# Patient Record
Sex: FEMALE | Race: WHITE | NOT HISPANIC OR LATINO | Employment: OTHER | URBAN - METROPOLITAN AREA
[De-identification: names, ages, dates, MRNs, and addresses within clinical notes are randomized per-mention and may not be internally consistent; named-entity substitution may affect disease eponyms.]

---

## 2018-01-25 ENCOUNTER — OFFICE VISIT (OUTPATIENT)
Dept: NEUROSURGERY | Facility: CLINIC | Age: 43
End: 2018-01-25

## 2018-01-25 VITALS
WEIGHT: 282.6 LBS | DIASTOLIC BLOOD PRESSURE: 112 MMHG | SYSTOLIC BLOOD PRESSURE: 162 MMHG | TEMPERATURE: 98.9 F | BODY MASS INDEX: 45.42 KG/M2 | HEIGHT: 66 IN

## 2018-01-25 DIAGNOSIS — M54.9 BACK PAIN WITHOUT RADIATION: Primary | ICD-10-CM

## 2018-01-25 DIAGNOSIS — M51.16 LUMBAR DISC HERNIATION WITH RADICULOPATHY: ICD-10-CM

## 2018-01-25 PROCEDURE — 99213 OFFICE O/P EST LOW 20 MIN: CPT | Performed by: PHYSICIAN ASSISTANT

## 2018-01-25 RX ORDER — CARISOPRODOL 350 MG/1
TABLET ORAL
Refills: 0 | COMMUNITY
Start: 2018-01-14

## 2018-01-25 RX ORDER — LISINOPRIL 10 MG/1
TABLET ORAL
Refills: 5 | COMMUNITY
Start: 2017-12-07

## 2018-01-25 RX ORDER — OMEGA-3-ACID ETHYL ESTERS 1 G/1
CAPSULE, LIQUID FILLED ORAL
Refills: 5 | COMMUNITY
Start: 2018-01-07

## 2018-01-25 RX ORDER — GABAPENTIN 300 MG/1
CAPSULE ORAL
Refills: 0 | COMMUNITY
Start: 2017-11-14 | End: 2018-03-16 | Stop reason: SDUPTHER

## 2018-01-25 NOTE — PROGRESS NOTES
"  Vashti Arredondo is a 42 y.o. female who presents with complaints of acute on chronic low back pain.    HISTORY OF PRESENT ILLNESS and HOSPITAL COURSE:  Vashti was last seen in our practice by Dr. Cruz on 12/01/16 with complaints of low back pain radiating to her left hip and leg.  On 3/13/15 she had a left L2 3 lumbar discectomy and did very well for a month or 2 but pain gradually returned.  An MRI scan of the lumbar spine was performed and demonstrated degenerative disc change at L2-3 and L3 4, no evidence of recurrent disc herniation at either level or at any other level.    She presents today with worsening pain that she describes unbearable and in the low back and radiates to the left buttock and left leg to the knee.  She has had low back pain since 2012, however, she states that it has significantly worsened over the past several months.    She was seen in the emergency department at [facility] on 01/13/18 for intractable back pain, at which time x-rays, lumbar spine, were performed.  Dr. Cruz and I reviewed the images today, however, further imaging with MRI is necessary to evaluate the etiology of her symptoms and possible evidence of nerve root compression.  She states that the pain is accompanied with tingling sensation and she has intermittent episodes of a \"shocking sensation\" with certain movements.  She gains some mild relief with leaning over as she ambulates, however walking in general worsens her symptoms.    REVIEW OF SYSTEMS:  Review of Systems   Constitutional: Positive for activity change and fatigue.   Musculoskeletal: Positive for arthralgias and back pain.   Neurological: Positive for weakness and numbness.   Psychiatric/Behavioral: Negative for sleep disturbance.       The following portions of the patient's history were reviewed and updated as appropriate: allergies, current medications, past family history, past medical history, past social history, past surgical history " and problem list.     PHYSICAL EXAM:  Physical Exam   Constitutional: She is oriented to person, place, and time. She appears well-developed and well-nourished. She appears distressed.   Neck: Normal range of motion. Neck supple.   Pulmonary/Chest: Effort normal and breath sounds normal.   Musculoskeletal:        Lumbar back: She exhibits decreased range of motion and pain.   Neurological: She is alert and oriented to person, place, and time. No cranial nerve deficit or sensory deficit. Gait abnormal.   Reflex Scores:       Patellar reflexes are 2+ on the right side and 2+ on the left side.       Achilles reflexes are 2+ on the right side and 2+ on the left side.  SLR + 30 degrees, left  SLR - , right   Skin: Skin is warm and dry.   Psychiatric: Her behavior is normal. Judgment and thought content normal. Her mood appears anxious. She exhibits a depressed mood.        ASSESSMENT AND PLAN:  Ms. Arredondo presents today with acute on chronic low back pain that radiates to the left buttock and leg.  MRI lumbar spine, with and without contrast, was ordered.  She will return in approximately 1 week after MRI is performed and Dr. Cruz and I will review the images at that time and provide further neurosurgical assessment and plan pertaining thereto.  She was instructed to contact her PCP for pain management prior to her follow up appointment as Dr. Cruz does not provide narcotic pain medication unless a patient is in the postoperative 90 days.

## 2018-02-01 ENCOUNTER — OFFICE VISIT (OUTPATIENT)
Dept: NEUROSURGERY | Facility: CLINIC | Age: 43
End: 2018-02-01

## 2018-02-01 ENCOUNTER — HOSPITAL ENCOUNTER (OUTPATIENT)
Dept: MRI IMAGING | Facility: HOSPITAL | Age: 43
Discharge: HOME | End: 2018-02-01
Admitting: PHYSICIAN ASSISTANT

## 2018-02-01 VITALS
BODY MASS INDEX: 45.67 KG/M2 | HEIGHT: 66 IN | WEIGHT: 284.2 LBS | SYSTOLIC BLOOD PRESSURE: 140 MMHG | DIASTOLIC BLOOD PRESSURE: 90 MMHG | TEMPERATURE: 98 F

## 2018-02-01 DIAGNOSIS — M51.36 DDD (DEGENERATIVE DISC DISEASE), LUMBAR: Primary | ICD-10-CM

## 2018-02-01 DIAGNOSIS — M51.16 LUMBAR DISC HERNIATION WITH RADICULOPATHY: ICD-10-CM

## 2018-02-01 DIAGNOSIS — M51.16 LUMBAR DISC DISEASE WITH RADICULOPATHY: ICD-10-CM

## 2018-02-01 DIAGNOSIS — M54.9 BACK PAIN WITHOUT RADIATION: ICD-10-CM

## 2018-02-01 PROCEDURE — 0 GADOBENATE DIMEGLUMINE 529 MG/ML SOLUTION: Performed by: PHYSICIAN ASSISTANT

## 2018-02-01 PROCEDURE — A9577 INJ MULTIHANCE: HCPCS | Performed by: PHYSICIAN ASSISTANT

## 2018-02-01 PROCEDURE — 72158 MRI LUMBAR SPINE W/O & W/DYE: CPT

## 2018-02-01 PROCEDURE — 82565 ASSAY OF CREATININE: CPT

## 2018-02-01 PROCEDURE — 99213 OFFICE O/P EST LOW 20 MIN: CPT | Performed by: PHYSICIAN ASSISTANT

## 2018-02-01 RX ADMIN — GADOBENATE DIMEGLUMINE 20 ML: 529 INJECTION, SOLUTION INTRAVENOUS at 09:30

## 2018-02-01 NOTE — PROGRESS NOTES
"  Vashti rAredondo is a 42 y.o. female who presents with complaints of low back pian, left buttock and leg pain.    HISTORY OF PRESENT ILLNESS and HOSPITAL COURSE:  Vashti was evaluated in our practice by Dr. Cruz on 12/01/16 with complaints of low back pain radiating to her left hip and leg.  On 3/13/15 she had a left L2 3 lumbar discectomy and did very well for a month or 2 but pain gradually returned.  An MRI scan of the lumbar spine was performed and demonstrated degenerative disc change at L2-3 and L3 4, no evidence of recurrent disc herniation at either level or at any other level.    She was seen in the emergency department at [facility] on 01/13/18 for intractable back pain, at which time x-rays, lumbar spine, were performed.  Dr. Cruz and I reviewed the images , however, further imaging with MRI was necessary to evaluate the etiology of her symptoms and possible evidence of nerve root compression.  She states that the pain is accompanied with tingling sensation and she has intermittent episodes of a \"shocking sensation\" with certain movements.  She gains some mild relief with leaning over as she ambulates, however walking in general worsens her symptoms.    She presents today for further evaluation with MRI lumbar spine, with and without contrast, images.  Dr. Cruz and I reviewed the MRI images which demonstrate a migrated disc fragment, L4-5, left.  This correlates with her subjective and objective exam findings.  She has not participated in physical therapy recently, therefore, she was provided with an order to begin PT, twice weekly, for 2-4 weeks, for possible symptom relief.  She was instructed to contact our office if her symptoms worsen with physical therapy.      REVIEW OF SYSTEMS:  Review of Systems  Constitutional: Positive for activity change and fatigue.   Musculoskeletal: Positive for arthralgias and back pain.   Neurological: Positive for weakness and numbness. "   Psychiatric/Behavioral: Negative for sleep disturbance.     The following portions of the patient's history were reviewed and updated as appropriate: allergies, current medications, past family history, past medical history, past social history, past surgical history and problem list.     PHYSICAL EXAM:  Physical Exam   Constitutional: She is oriented to person, place, and time. She appears well-developed and well-nourished. She appears distressed.   Neck: Normal range of motion. Neck supple.   Pulmonary/Chest: Effort normal and breath sounds normal.   Musculoskeletal:        Lumbar back: She exhibits decreased range of motion and pain.   Neurological: She is alert and oriented to person, place, and time. No cranial nerve deficit or sensory deficit. Gait abnormal.   Reflex Scores:       Patellar reflexes are 2+ on the right side and 2+ on the left side.       Achilles reflexes are 2+ on the right side and 2+ on the left side.  SLR + 30 degrees, left  SLR - , right   Skin: Skin is warm and dry.   Psychiatric: Her behavior is normal. Judgment and thought content normal. Her mood appears anxious. She exhibits a depressed mood.     ASSESSMENT AND PLAN:  Ms. Arredondo is a delightful patient that presents today for MRI lumbar spine image review due to complaints of intractable back pain that radiates to the left buttock and leg and stops at the left knee.  MRI images demonstrate a migrated disc fragment, L4-5, left, that correlates with her exam findings.  She will participate in physical therapy in the next 2 weeks and will return for further neurosurgical evaluation with Dr. Cruz.  She was instructed to stop physical therapy if her symptoms worsen by participating in such.

## 2018-02-02 LAB — CREAT BLDA-MCNC: 0.5 MG/DL (ref 0.6–1.3)

## 2018-02-15 ENCOUNTER — OFFICE VISIT (OUTPATIENT)
Dept: NEUROSURGERY | Facility: CLINIC | Age: 43
End: 2018-02-15

## 2018-02-15 VITALS — WEIGHT: 284.39 LBS | HEIGHT: 66 IN | BODY MASS INDEX: 45.71 KG/M2

## 2018-02-15 DIAGNOSIS — M51.36 BULGING LUMBAR DISC: Primary | ICD-10-CM

## 2018-02-15 PROBLEM — M51.369 BULGING LUMBAR DISC: Status: ACTIVE | Noted: 2018-02-15

## 2018-02-15 PROCEDURE — 99213 OFFICE O/P EST LOW 20 MIN: CPT | Performed by: NEUROLOGICAL SURGERY

## 2018-02-15 RX ORDER — AMLODIPINE BESYLATE 5 MG/1
TABLET ORAL
Refills: 5 | COMMUNITY
Start: 2018-02-03

## 2018-02-15 NOTE — PROGRESS NOTES
Subjective   Vashti Arredondo is a 42 y.o. female who presents for follow up of  left hip and leg pain.     The patient is a 42-year-old woman with a complaint of pain in her left hip and leg since early December, now 2-1/2 months.  Began spontaneously and has been severe ever since radiating as far as her left knee.  She has a past history of left L2-3 lumbar discectomy in March 2015.  She has been through extended physical therapy in the past 2 months but no improvement seen.    Today lumbar MRI scan shows a possible recurrent disc herniation at L2-3 on the left, and a questionable inferiorly migrated fragment small in size medial to the pedicle at L4-5 on the left.    The following portions of the patient's history were reviewed, updated as appropriate and approved: allergies, current medications, past family history, past medical history, past social history, past surgical history, review of systems and problem list.     Review of Systems   Constitutional: Negative for activity change, appetite change, chills, diaphoresis, fatigue, fever and unexpected weight change.   HENT: Negative for congestion, dental problem, drooling, ear discharge, ear pain, facial swelling, hearing loss, mouth sores, nosebleeds, postnasal drip, rhinorrhea, sinus pressure, sneezing, sore throat, tinnitus, trouble swallowing and voice change.    Eyes: Negative for photophobia, pain, discharge, redness, itching and visual disturbance.   Respiratory: Negative for apnea, cough, choking, chest tightness, shortness of breath, wheezing and stridor.    Cardiovascular: Negative for chest pain, palpitations and leg swelling.   Gastrointestinal: Negative for abdominal distention, abdominal pain, anal bleeding, blood in stool, constipation, diarrhea, nausea, rectal pain and vomiting.   Endocrine: Negative for cold intolerance, heat intolerance, polydipsia, polyphagia and polyuria.   Genitourinary: Negative for decreased urine volume, difficulty  urinating, dysuria, enuresis, flank pain, frequency, genital sores, hematuria and urgency.   Musculoskeletal: Positive for arthralgias and back pain. Negative for gait problem, joint swelling, myalgias, neck pain and neck stiffness.   Skin: Negative for color change, pallor, rash and wound.   Allergic/Immunologic: Negative for environmental allergies, food allergies and immunocompromised state.   Neurological: Positive for weakness and numbness. Negative for dizziness, tremors, seizures, syncope, facial asymmetry, speech difficulty, light-headedness and headaches.   Hematological: Negative for adenopathy. Does not bruise/bleed easily.   Psychiatric/Behavioral: Positive for sleep disturbance. Negative for agitation, behavioral problems, confusion, decreased concentration, dysphoric mood, hallucinations, self-injury and suicidal ideas. The patient is not nervous/anxious and is not hyperactive.        Objective    NEUROLOGICAL EXAMINATION:    SLR positive on the left at 35°.  Motor and sensory are intact.  Left knee jerk absent right knee jerk 2+, ankle jerks 2+.    Assessment   Recurrent disc herniation left L2-3 versus left L4-5.  Distribution of the pain no lower than the knee and reduced or absent left knee reflex suggests the L2-3 level has responsible.       Plan   Lumbar myelogram       John Cruz MD

## 2018-02-19 ENCOUNTER — APPOINTMENT (OUTPATIENT)
Dept: CT IMAGING | Facility: HOSPITAL | Age: 43
End: 2018-02-19

## 2018-02-19 ENCOUNTER — HOSPITAL ENCOUNTER (OUTPATIENT)
Facility: HOSPITAL | Age: 43
Setting detail: HOSPITAL OUTPATIENT SURGERY
Discharge: HOME | End: 2018-02-19
Attending: RADIOLOGY | Admitting: RADIOLOGY

## 2018-02-19 VITALS
TEMPERATURE: 97.7 F | OXYGEN SATURATION: 98 % | SYSTOLIC BLOOD PRESSURE: 120 MMHG | DIASTOLIC BLOOD PRESSURE: 86 MMHG | BODY MASS INDEX: 45.07 KG/M2 | RESPIRATION RATE: 16 BRPM | HEIGHT: 66 IN | WEIGHT: 280.43 LBS | HEART RATE: 107 BPM

## 2018-02-19 DIAGNOSIS — M51.36 BULGING LUMBAR DISC: ICD-10-CM

## 2018-02-19 LAB — GLUCOSE BLDC GLUCOMTR-MCNC: 306 MG/DL (ref 70–130)

## 2018-02-19 PROCEDURE — 62304 MYELOGRAPHY LUMBAR INJECTION: CPT | Performed by: RADIOLOGY

## 2018-02-19 PROCEDURE — 0 IOPAMIDOL 41 % SOLUTION: Performed by: RADIOLOGY

## 2018-02-19 PROCEDURE — 82962 GLUCOSE BLOOD TEST: CPT

## 2018-02-19 PROCEDURE — 72132 CT LUMBAR SPINE W/DYE: CPT

## 2018-02-19 RX ORDER — LIDOCAINE HYDROCHLORIDE 10 MG/ML
INJECTION, SOLUTION EPIDURAL; INFILTRATION; INTRACAUDAL; PERINEURAL AS NEEDED
Status: DISCONTINUED | OUTPATIENT
Start: 2018-02-19 | End: 2018-02-19 | Stop reason: HOSPADM

## 2018-03-01 ENCOUNTER — OFFICE VISIT (OUTPATIENT)
Dept: NEUROSURGERY | Facility: CLINIC | Age: 43
End: 2018-03-01

## 2018-03-01 VITALS — WEIGHT: 282 LBS | TEMPERATURE: 98 F | HEIGHT: 66 IN | BODY MASS INDEX: 45.32 KG/M2

## 2018-03-01 DIAGNOSIS — M51.16 LUMBAR DISC DISEASE WITH RADICULOPATHY: Primary | ICD-10-CM

## 2018-03-01 DIAGNOSIS — M51.16 LUMBAR DISC HERNIATION WITH RADICULOPATHY: ICD-10-CM

## 2018-03-01 PROCEDURE — 99213 OFFICE O/P EST LOW 20 MIN: CPT | Performed by: NEUROLOGICAL SURGERY

## 2018-03-01 NOTE — PROGRESS NOTES
Subjective   Vashti Arredondo is a 42 y.o. female who presents for follow up of  recurrent left hip and leg pain.     The patient developed severe left hip and leg pain in early December.  MRI scan showed a possible recurrent disc herniation at L2-3 on the left, where she had a discectomy in March 2015, 3 years ago.  She was treated with physical therapy for 2 months.  Symptoms have improved although still bother her quite noticeably from time to time.    Lumbar myelogram was performed and shows a disc bulge at L2-3 on the left without any sherron free fragment.    The following portions of the patient's history were reviewed, updated as appropriate and approved: allergies, current medications, past family history, past medical history, past social history, past surgical history, review of systems and problem list.     Review of Systems   Constitutional: Negative for activity change, appetite change, chills, diaphoresis, fatigue, fever and unexpected weight change.   HENT: Negative for congestion, dental problem, drooling, ear discharge, ear pain, facial swelling, hearing loss, mouth sores, nosebleeds, postnasal drip, rhinorrhea, sinus pressure, sneezing, sore throat, tinnitus, trouble swallowing and voice change.    Eyes: Negative for photophobia, pain, discharge, redness, itching and visual disturbance.   Respiratory: Negative for apnea, cough, choking, chest tightness, shortness of breath, wheezing and stridor.    Cardiovascular: Negative for chest pain, palpitations and leg swelling.   Gastrointestinal: Negative for abdominal distention, abdominal pain, anal bleeding, blood in stool, constipation, diarrhea, nausea, rectal pain and vomiting.   Endocrine: Negative for cold intolerance, heat intolerance, polydipsia, polyphagia and polyuria.   Genitourinary: Negative for decreased urine volume, difficulty urinating, dysuria, enuresis, flank pain, frequency, genital sores, hematuria and urgency.   Musculoskeletal:  Positive for back pain and gait problem. Negative for arthralgias, joint swelling, myalgias, neck pain and neck stiffness.   Skin: Negative for color change, pallor, rash and wound.   Allergic/Immunologic: Negative for environmental allergies, food allergies and immunocompromised state.   Neurological: Negative for dizziness, tremors, seizures, syncope, facial asymmetry, speech difficulty, weakness, light-headedness, numbness and headaches.   Hematological: Negative for adenopathy. Does not bruise/bleed easily.   Psychiatric/Behavioral: Negative for agitation, behavioral problems, confusion, decreased concentration, dysphoric mood, hallucinations, self-injury, sleep disturbance and suicidal ideas. The patient is not nervous/anxious and is not hyperactive.    All other systems reviewed and are negative.      Objective    NEUROLOGICAL EXAMINATION:    SLR positive at 40° on the left.  Motor and sensory intact.  Left knee jerk absent, right knee jerk 2+.    Assessment   Small recurrent disc herniation L2-3 left, some improvement in symptoms over the past 3 months.       Plan   I believe conservative management is best this time.  With the findings on the studies I believe it would be necessary to do a complete facetectomy at L2-3 on the left to get sufficient access to this disc without injuring the dura or nerve root, and this would introduce and instability that would lead to need for stabilization with interbody fusion and pedicle screws.  For the time being if symptoms are manageable a nonsurgical approach were discussed.       John Cruz MD

## 2018-03-16 ENCOUNTER — TELEPHONE (OUTPATIENT)
Dept: NEUROSURGERY | Facility: CLINIC | Age: 43
End: 2018-03-16

## 2018-03-16 DIAGNOSIS — IMO0002 UNCONTROLLED TYPE 2 DIABETES MELLITUS WITH COMPLICATION, WITHOUT LONG-TERM CURRENT USE OF INSULIN: Primary | ICD-10-CM

## 2018-03-16 PROBLEM — E11.69 DM COMA, TYPE 2, NOT AT GOAL (HCC): Status: ACTIVE | Noted: 2018-03-16

## 2018-03-16 RX ORDER — GABAPENTIN 300 MG/1
300 CAPSULE ORAL 4 TIMES DAILY
Qty: 120 CAPSULE | Refills: 0 | OUTPATIENT
Start: 2018-03-16

## 2018-03-16 NOTE — TELEPHONE ENCOUNTER
Medication was called in for patient.  I have checked the entire Epic referral system and they do not have one for PCP.  I did look up PCP's at [facility] and found a Dr. Atkinson at [facility]. I tried to reach patient on both numbers but was unable to reach her or to leave a message.  I will try to reach her later.  Patient needs to be put on the schedule with RIK Zaragoza in April.    Pharmacy called back to confirm quantity at 3:24.  I reiterated that this is an increase.

## 2018-03-16 NOTE — TELEPHONE ENCOUNTER
Provider:  Anthony  Caller: Vashti Arredondo  Time of call:   08:56 AM  Phone #:  [phone]  Last visit:   03/01/18  Next visit: none scheduled    Reason for call:       Dr Cruz told her he wanted to wait to do the surgery as long as possible because it was going to be such a big surgery. She doesn't feel like she can wait any longer because she's in so much pain. Wants to know if we can proceed with surgery or what else Dr. Cruz wants to do

## 2018-03-16 NOTE — TELEPHONE ENCOUNTER
Patient states that she is not able to take Steroids because she has diabetes.  She is not allowed to even have injections.  She is not insulin dependent, however her glucose runs in the 300's.

## 2018-03-16 NOTE — TELEPHONE ENCOUNTER
I made the referral to Internal medicine at [facility]. This would be my preference if they take her insurance.

## 2018-03-16 NOTE — TELEPHONE ENCOUNTER
"Spoke with patient.   She states that her blood sugar runs 300 or so. She is taking metformin only.   She is \"in between\" PCPs, and is not seeing anyone about his right now.  She is having trouble finding someone who takes her insurance.       Her pain is in her back and left leg. The electric shock feeling is the worst part of the pain. It feels throbbing and aching as well.        She is taking neurontin TID. She can't take tramadol b/c it gives her migraine headaches.  Soma did not help with her pain.     Hold the steroids for now since she's not managing her blood sugars; we will increase her gabapentin to 4x daily. (300mg)     Please have her come in to see me in follow up in Early april      "

## 2018-04-02 ENCOUNTER — TELEPHONE (OUTPATIENT)
Dept: NEUROSURGERY | Facility: CLINIC | Age: 43
End: 2018-04-02

## 2018-04-02 NOTE — TELEPHONE ENCOUNTER
"Provider:  Bean  Caller: pt  Time of call:   09:37am  Phone #:  [phone]  Surgery:    Surgery Date:    Last visit:   03/01/18  Next visit: NA    Reason for call:         Pt left msg stating, \"I know my sugar is bad, but I'm willing to take the risk. I can't take the pain anymore.\"    Spoke with pt- she wants to move forward with surgery.  States the Neurontin 300mg QID does not help.     I checked on PCP referral- [facility]  Pt is speaking with referral coordinator now for details and appt info.      Pt would like to know what else we can do to help with her pain or move forward with surgery.  Please advise.     "

## 2018-04-02 NOTE — TELEPHONE ENCOUNTER
She has the option of seeing pain management for an injection.  From previous messages, Mary asked to have the patient placed on her schedule in early April. I do not see that that is scheduled.

## 2018-04-09 ENCOUNTER — OFFICE VISIT (OUTPATIENT)
Dept: NEUROSURGERY | Facility: CLINIC | Age: 43
End: 2018-04-09

## 2018-04-09 VITALS
SYSTOLIC BLOOD PRESSURE: 150 MMHG | WEIGHT: 286.4 LBS | BODY MASS INDEX: 46.03 KG/M2 | TEMPERATURE: 98.5 F | HEIGHT: 66 IN | DIASTOLIC BLOOD PRESSURE: 90 MMHG

## 2018-04-09 DIAGNOSIS — M54.42 CHRONIC MIDLINE LOW BACK PAIN WITH LEFT-SIDED SCIATICA: Primary | ICD-10-CM

## 2018-04-09 DIAGNOSIS — G89.29 CHRONIC MIDLINE LOW BACK PAIN WITH LEFT-SIDED SCIATICA: Primary | ICD-10-CM

## 2018-04-09 PROCEDURE — 99213 OFFICE O/P EST LOW 20 MIN: CPT | Performed by: PHYSICIAN ASSISTANT

## 2018-04-09 NOTE — PROGRESS NOTES
"Subjective     Chief Complaint: : Vashti Arredondo is a 42 y.o. female here today for follow up for recurrent left hip and leg pain    History of Present Illness  Vashti Arredondo is a 42 y.o. female who had a discectomy three years ago (3/2015) at L2/3 on the left. She had done well until early this last December, when she presented with recurrent left hip and leg pain in the same distribution. After two months of physical therapy, a lumbar myelogram was performed which shows a disc bulge at L2/3 on the left.  She saw Dr Cruz about one month ago and conservative treatment seemed to be helping. He believes that surgical treatment of this will require a full facetectomy and probable fusion and has therefore encouraged continued conservative treatment. She is a poor candidate for fusion due to obesity and poorly controlled diabetes.   However, he pain has continued to be very severe. She is unable to walk or be up for more than a few minutes at a time. She describes pain across her low back and an \"electric shock\" feeling in the posterior and lateral part of her left thigh which is present all the time. The pain sometimes causes nausea due to its severity. She is unable to take steroids due to her diabetes. She is under the care of the diabetic center on [facility] ([facility] She is taking gabapentin qid, which helps with her diabetic neuropathy, but not really with this pain. She alternates tylenol and ibuprofen for some relief. She had soma, but this is not really a muscle spasm and that medication did not help.  She denies saddle anesthesia. She does have episodes of urge incontinence because she moves slowly and has difficulty getting to the bathroom on time.     The following portions of the patient's history were reviewed and updated as appropriate: allergies, current medications, past family history, past medical history, past social history, past surgical history and problem " list.    Family history:   Family History   Problem Relation Age of Onset   • COPD Mother    • Asthma Mother    • Heart disease Father        Social history:   Social History     Social History   • Marital status:      Spouse name: N/A   • Number of children: N/A   • Years of education: N/A     Occupational History   • Not on file.     Social History Main Topics   • Smoking status: Current Every Day Smoker     Packs/day: 1.00     Types: Cigarettes   • Smokeless tobacco: Never Used   • Alcohol use No   • Drug use: No   • Sexual activity: Defer     Other Topics Concern   • Not on file     Social History Narrative   • No narrative on file       Review of Systems   Constitutional: Negative for activity change, appetite change, chills, diaphoresis, fatigue, fever and unexpected weight change.   HENT: Negative for congestion, dental problem, drooling, ear discharge, ear pain, facial swelling, hearing loss, mouth sores, nosebleeds, postnasal drip, rhinorrhea, sinus pressure, sneezing, sore throat, tinnitus, trouble swallowing and voice change.    Eyes: Negative for photophobia, pain, discharge, redness, itching and visual disturbance.   Respiratory: Negative for apnea, cough, choking, chest tightness, shortness of breath, wheezing and stridor.    Cardiovascular: Negative for chest pain, palpitations and leg swelling.   Gastrointestinal: Negative for abdominal distention, abdominal pain, anal bleeding, blood in stool, constipation, diarrhea, nausea, rectal pain and vomiting.   Endocrine: Negative for cold intolerance, heat intolerance, polydipsia, polyphagia and polyuria.   Genitourinary: Negative for decreased urine volume, difficulty urinating, dysuria, enuresis, flank pain, frequency, genital sores, hematuria and urgency.   Musculoskeletal: Positive for back pain and gait problem. Negative for arthralgias, joint swelling, myalgias, neck pain and neck stiffness.   Skin: Negative for color change, pallor, rash and  "wound.   Allergic/Immunologic: Negative for environmental allergies, food allergies and immunocompromised state.   Neurological: Negative for dizziness, tremors, seizures, syncope, facial asymmetry, speech difficulty, weakness, light-headedness, numbness and headaches.   Hematological: Negative for adenopathy. Does not bruise/bleed easily.   Psychiatric/Behavioral: Negative for agitation, behavioral problems, confusion, decreased concentration, dysphoric mood, hallucinations, self-injury, sleep disturbance and suicidal ideas. The patient is not nervous/anxious and is not hyperactive.    All other systems reviewed and are negative.      Objective   Blood pressure 150/90, temperature 98.5 °F (36.9 °C), height 167.6 cm (65.98\"), weight 130 kg (286 lb 6.4 oz).  Body mass index is 46.25 kg/m².    Physical Exam   Constitutional: She is oriented to person, place, and time. She appears well-developed and well-nourished.   In obvious pain but not distressed   HENT:   Head: Normocephalic and atraumatic.   Eyes: EOM are normal. Pupils are equal, round, and reactive to light.   Neck: Normal range of motion. Neck supple.   Pulmonary/Chest: Effort normal. No respiratory distress.   Musculoskeletal: She exhibits no tenderness.   Gait is very slow and antalgic. She endorses shooting pains when standing from a sitting position, stepping up to exam table, and turning while walking. No sherron weakness   Neurological: She is alert and oriented to person, place, and time. No cranial nerve deficit. She displays no Babinski's sign on the right side. She displays no Babinski's sign on the left side.   Reflex Scores:       Patellar reflexes are 1+ on the right side and 0 on the left side.       Achilles reflexes are 1+ on the right side and 0 on the left side.  sensation intact.     Assessment/Plan   Dr. Cruz has reviewed her MRI and myelogram. He does not feel that she would be benefited by surgery, and is concerned that her risk level " would be unacceptably high as well.  He has recommended referral to pain management; a referral has been placed.     There are no diagnoses linked to this encounter.    No Follow-up on file.

## 2018-04-10 ENCOUNTER — TELEPHONE (OUTPATIENT)
Dept: NEUROSURGERY | Facility: CLINIC | Age: 43
End: 2018-04-10

## 2018-04-10 NOTE — TELEPHONE ENCOUNTER
Spoke with patient about pain management; they wouldn't do RIMA because diabetes. We will try to get her in with someone asap.

## 2018-04-13 NOTE — TELEPHONE ENCOUNTER
Provider: Anthony    Last visit:  04/09/18 (Mary)   Next visit: PRN    Reason for call:       See ET's last note.

## 2018-04-13 NOTE — TELEPHONE ENCOUNTER
Is Dr. Nuñez willing to meet with her about his recommendations? Yes, she should talk with her PCP about her medical/weight issues, but I think she's gone that route too.

## 2018-04-24 ENCOUNTER — TELEPHONE (OUTPATIENT)
Dept: PAIN MEDICINE | Facility: CLINIC | Age: 43
End: 2018-04-24

## 2018-06-20 ENCOUNTER — APPOINTMENT (OUTPATIENT)
Dept: CT IMAGING | Facility: HOSPITAL | Age: 43
End: 2018-06-20

## 2018-06-20 ENCOUNTER — HOSPITAL ENCOUNTER (EMERGENCY)
Facility: HOSPITAL | Age: 43
Discharge: HOME | End: 2018-06-20
Attending: EMERGENCY MEDICINE | Admitting: EMERGENCY MEDICINE

## 2018-06-20 VITALS
HEIGHT: 66 IN | SYSTOLIC BLOOD PRESSURE: 152 MMHG | DIASTOLIC BLOOD PRESSURE: 91 MMHG | WEIGHT: 280 LBS | TEMPERATURE: 98.7 F | BODY MASS INDEX: 45 KG/M2 | HEART RATE: 106 BPM | OXYGEN SATURATION: 97 % | RESPIRATION RATE: 20 BRPM

## 2018-06-20 DIAGNOSIS — R10.31 RIGHT LOWER QUADRANT ABDOMINAL PAIN: Primary | ICD-10-CM

## 2018-06-20 LAB
ALBUMIN SERPL-MCNC: 4.34 G/DL (ref 3.2–4.8)
ALBUMIN/GLOB SERPL: 1.4 G/DL (ref 1.5–2.5)
ALP SERPL-CCNC: 122 U/L (ref 25–100)
ALT SERPL W P-5'-P-CCNC: 81 U/L (ref 7–40)
ANION GAP SERPL CALCULATED.3IONS-SCNC: 10 MMOL/L (ref 3–11)
AST SERPL-CCNC: 78 U/L (ref 0–33)
B-HCG UR QL: NEGATIVE
BASOPHILS # BLD AUTO: 0.04 10*3/MM3 (ref 0–0.2)
BASOPHILS NFR BLD AUTO: 0.3 % (ref 0–1)
BILIRUB SERPL-MCNC: 0.5 MG/DL (ref 0.3–1.2)
BILIRUB UR QL STRIP: NEGATIVE
BUN BLD-MCNC: 9 MG/DL (ref 9–23)
BUN/CREAT SERPL: 14.5 (ref 7–25)
CALCIUM SPEC-SCNC: 9.4 MG/DL (ref 8.7–10.4)
CHLORIDE SERPL-SCNC: 109 MMOL/L (ref 99–109)
CLARITY UR: CLEAR
CO2 SERPL-SCNC: 21 MMOL/L (ref 20–31)
COLOR UR: YELLOW
CREAT BLD-MCNC: 0.62 MG/DL (ref 0.6–1.3)
DEPRECATED RDW RBC AUTO: 46.3 FL (ref 37–54)
EOSINOPHIL # BLD AUTO: 0.47 10*3/MM3 (ref 0–0.3)
EOSINOPHIL NFR BLD AUTO: 3.4 % (ref 0–3)
ERYTHROCYTE [DISTWIDTH] IN BLOOD BY AUTOMATED COUNT: 14.2 % (ref 11.3–14.5)
GFR SERPL CREATININE-BSD FRML MDRD: 105 ML/MIN/1.73
GLOBULIN UR ELPH-MCNC: 3.1 GM/DL
GLUCOSE BLD-MCNC: 132 MG/DL (ref 70–100)
GLUCOSE UR STRIP-MCNC: NEGATIVE MG/DL
HCT VFR BLD AUTO: 42.6 % (ref 34.5–44)
HGB BLD-MCNC: 14.1 G/DL (ref 11.5–15.5)
HGB UR QL STRIP.AUTO: NEGATIVE
IMM GRANULOCYTES # BLD: 0.05 10*3/MM3 (ref 0–0.03)
IMM GRANULOCYTES NFR BLD: 0.4 % (ref 0–0.6)
INTERNAL NEGATIVE CONTROL: NEGATIVE
INTERNAL POSITIVE CONTROL: POSITIVE
KETONES UR QL STRIP: NEGATIVE
LEUKOCYTE ESTERASE UR QL STRIP.AUTO: NEGATIVE
LIPASE SERPL-CCNC: 57 U/L (ref 6–51)
LYMPHOCYTES # BLD AUTO: 5.25 10*3/MM3 (ref 0.6–4.8)
LYMPHOCYTES NFR BLD AUTO: 38.3 % (ref 24–44)
Lab: NORMAL
MCH RBC QN AUTO: 29.6 PG (ref 27–31)
MCHC RBC AUTO-ENTMCNC: 33.1 G/DL (ref 32–36)
MCV RBC AUTO: 89.3 FL (ref 80–99)
MONOCYTES # BLD AUTO: 0.95 10*3/MM3 (ref 0–1)
MONOCYTES NFR BLD AUTO: 6.9 % (ref 0–12)
NEUTROPHILS # BLD AUTO: 7 10*3/MM3 (ref 1.5–8.3)
NEUTROPHILS NFR BLD AUTO: 51.1 % (ref 41–71)
NITRITE UR QL STRIP: NEGATIVE
PH UR STRIP.AUTO: 5.5 [PH] (ref 5–8)
PLATELET # BLD AUTO: 234 10*3/MM3 (ref 150–450)
PMV BLD AUTO: 11.4 FL (ref 6–12)
POTASSIUM BLD-SCNC: 3.8 MMOL/L (ref 3.5–5.5)
PROT SERPL-MCNC: 7.4 G/DL (ref 5.7–8.2)
PROT UR QL STRIP: NEGATIVE
RBC # BLD AUTO: 4.77 10*6/MM3 (ref 3.89–5.14)
SODIUM BLD-SCNC: 140 MMOL/L (ref 132–146)
SP GR UR STRIP: 1.01 (ref 1–1.03)
UROBILINOGEN UR QL STRIP: NORMAL
WBC NRBC COR # BLD: 13.71 10*3/MM3 (ref 3.5–10.8)

## 2018-06-20 PROCEDURE — 74177 CT ABD & PELVIS W/CONTRAST: CPT

## 2018-06-20 PROCEDURE — 85025 COMPLETE CBC W/AUTO DIFF WBC: CPT | Performed by: NURSE PRACTITIONER

## 2018-06-20 PROCEDURE — 25010000002 KETOROLAC TROMETHAMINE PER 15 MG: Performed by: NURSE PRACTITIONER

## 2018-06-20 PROCEDURE — 25010000002 ONDANSETRON PER 1 MG: Performed by: EMERGENCY MEDICINE

## 2018-06-20 PROCEDURE — 25010000002 MORPHINE PER 10 MG: Performed by: EMERGENCY MEDICINE

## 2018-06-20 PROCEDURE — 80053 COMPREHEN METABOLIC PANEL: CPT | Performed by: NURSE PRACTITIONER

## 2018-06-20 PROCEDURE — 83690 ASSAY OF LIPASE: CPT | Performed by: NURSE PRACTITIONER

## 2018-06-20 PROCEDURE — 25010000002 IOPAMIDOL 61 % SOLUTION: Performed by: EMERGENCY MEDICINE

## 2018-06-20 PROCEDURE — 81003 URINALYSIS AUTO W/O SCOPE: CPT | Performed by: NURSE PRACTITIONER

## 2018-06-20 PROCEDURE — 99283 EMERGENCY DEPT VISIT LOW MDM: CPT

## 2018-06-20 PROCEDURE — 96361 HYDRATE IV INFUSION ADD-ON: CPT

## 2018-06-20 PROCEDURE — 96375 TX/PRO/DX INJ NEW DRUG ADDON: CPT

## 2018-06-20 PROCEDURE — 96376 TX/PRO/DX INJ SAME DRUG ADON: CPT

## 2018-06-20 PROCEDURE — 96374 THER/PROPH/DIAG INJ IV PUSH: CPT

## 2018-06-20 RX ORDER — KETOROLAC TROMETHAMINE 30 MG/ML
30 INJECTION, SOLUTION INTRAMUSCULAR; INTRAVENOUS ONCE
Status: COMPLETED | OUTPATIENT
Start: 2018-06-20 | End: 2018-06-20

## 2018-06-20 RX ORDER — SODIUM CHLORIDE 0.9 % (FLUSH) 0.9 %
10 SYRINGE (ML) INJECTION AS NEEDED
Status: DISCONTINUED | OUTPATIENT
Start: 2018-06-20 | End: 2018-06-20 | Stop reason: HOSPADM

## 2018-06-20 RX ORDER — ONDANSETRON 2 MG/ML
4 INJECTION INTRAMUSCULAR; INTRAVENOUS ONCE
Status: COMPLETED | OUTPATIENT
Start: 2018-06-20 | End: 2018-06-20

## 2018-06-20 RX ORDER — MORPHINE SULFATE 4 MG/ML
4 INJECTION, SOLUTION INTRAMUSCULAR; INTRAVENOUS ONCE
Status: COMPLETED | OUTPATIENT
Start: 2018-06-20 | End: 2018-06-20

## 2018-06-20 RX ADMIN — SODIUM CHLORIDE 1000 ML: 9 INJECTION, SOLUTION INTRAVENOUS at 02:53

## 2018-06-20 RX ADMIN — ONDANSETRON 4 MG: 2 INJECTION INTRAMUSCULAR; INTRAVENOUS at 02:53

## 2018-06-20 RX ADMIN — MORPHINE SULFATE 4 MG: 4 INJECTION, SOLUTION INTRAMUSCULAR; INTRAVENOUS at 04:37

## 2018-06-20 RX ADMIN — MORPHINE SULFATE 4 MG: 4 INJECTION, SOLUTION INTRAMUSCULAR; INTRAVENOUS at 02:53

## 2018-06-20 RX ADMIN — KETOROLAC TROMETHAMINE 30 MG: 30 INJECTION, SOLUTION INTRAMUSCULAR; INTRAVENOUS at 05:22

## 2018-06-20 RX ADMIN — IOPAMIDOL 100 ML: 612 INJECTION, SOLUTION INTRAVENOUS at 03:15

## 2018-06-20 NOTE — ED PROVIDER NOTES
"Subjective   A 43-year-old white female that presents emergency Department with complaints of right lower quadrant abdominal pain.  Patient spines that the pain started this morning and has gotten worse.  Patient complains of nausea, vomiting.  Patient denies any diarrhea.  Patient denies any fevers, chills.  Patient is uncomfortable with ambulation or any type of movement.  Patient complains of diffuse pain in her lower back.  Patient denies any urinary frequency, burning, urgency.        History provided by:  Patient  Abdominal Pain   Pain location:  RLQ  Pain quality: aching and dull    Pain severity:  Moderate  Timing:  Constant  Progression:  Worsening  Relieved by:  Nothing  Worsened by:  Nothing  Ineffective treatments:  None tried  Associated symptoms: nausea and vomiting    Associated symptoms: no chills, no constipation, no cough, no diarrhea, no dysuria, no fever and no shortness of breath    Risk factors: obesity        Review of Systems   Constitutional: Negative for chills and fever.   Respiratory: Negative for cough and shortness of breath.    Gastrointestinal: Positive for abdominal pain, nausea and vomiting. Negative for constipation and diarrhea.   Genitourinary: Negative for dysuria, frequency and urgency.   Musculoskeletal: Positive for back pain.   All other systems reviewed and are negative.      Past Medical History:   Diagnosis Date   • Heard esophagus    • Chronic pain    • Diabetes mellitus    • Fibromyalgia    • Fracture of coccyx    • Heartburn    • Hepatitis C    • History of sinus bradycardia    • RONQUILLO (nonalcoholic steatohepatitis)    • Obesity    • Osteoporosis    • Reflex sympathetic dystrophy    • Rheumatoid factor positive        Allergies   Allergen Reactions   • Lyrica [Pregabalin] Other (See Comments) and Mental Status Change     Blisters in mouth   • Reglan [Metoclopramide] Other (See Comments)     IV administration - pt states she \"felt really weird\"       Past Surgical " History:   Procedure Laterality Date   • BACK SURGERY     • CARDIAC CATHETERIZATION     • CHOLECYSTECTOMY     • DILATATION AND CURETTAGE     • FOOT SURGERY     • HYSTERECTOMY     • INTERVENTIONAL RADIOLOGY PROCEDURE N/A 2/19/2018    Procedure: myelogram lumbar spine;  Surgeon: Octaviano Breen MD;  Location: [facility] INVASIVE LOCATION;  Service:    • LIVER BIOPSY     • UPPER GASTROINTESTINAL ENDOSCOPY      diagnostic       Family History   Problem Relation Age of Onset   • COPD Mother    • Asthma Mother    • Heart disease Father        Social History     Social History   • Marital status:      Social History Main Topics   • Smoking status: Current Every Day Smoker     Packs/day: 1.00     Types: Cigarettes   • Smokeless tobacco: Never Used   • Alcohol use No   • Drug use: No   • Sexual activity: Defer     Other Topics Concern   • Not on file           Objective   Physical Exam   Constitutional: She is oriented to person, place, and time. She appears well-developed and well-nourished. She appears distressed.   Uncomfortable.   HENT:   Head: Normocephalic and atraumatic.   Right Ear: External ear normal.   Left Ear: External ear normal.   Mouth/Throat: Oropharynx is clear and moist. No oropharyngeal exudate.   Eyes: Conjunctivae and EOM are normal. Pupils are equal, round, and reactive to light.   Neck: Normal range of motion.   Cardiovascular: Tachycardia present.    Pulmonary/Chest: Effort normal and breath sounds normal. No respiratory distress.   Abdominal: Soft. Bowel sounds are normal. She exhibits no distension. There is tenderness (RLQ, RUQ).   Musculoskeletal: Normal range of motion. She exhibits no edema or tenderness.   Neurological: She is alert and oriented to person, place, and time. No cranial nerve deficit.   Skin: Skin is warm and dry.   Psychiatric: She has a normal mood and affect.   Nursing note and vitals reviewed.      Procedures           ED Course  ED Course as of Jun 20 0538 Wed Jun  20, 2018   [id number]  she does advise her results at this time.  Patient is aware of the CT results are negative.  Patient will be discharged home.  Pt advises she is still in pain.  Pt will be Given an additional dose of medication for pain.  She agrees and verbalizes understanding.  [KG]      ED Course User Index  [KG] Sandee Beltran, DAMEON        Recent Results (from the past 24 hour(s))   Urinalysis With / Microscopic If Indicated (No Culture) - Urine, Clean Catch    Collection Time: 06/20/18  2:51 AM   Result Value Ref Range    Color, UA Yellow Yellow, Straw    Appearance, UA Clear Clear    pH, UA 5.5 5.0 - 8.0    Specific Gravity, UA 1.007 1.001 - 1.030    Glucose, UA Negative Negative    Ketones, UA Negative Negative    Bilirubin, UA Negative Negative    Blood, UA Negative Negative    Protein, UA Negative Negative    Leuk Esterase, UA Negative Negative    Nitrite, UA Negative Negative    Urobilinogen, UA 0.2 E.U./dL 0.2 - 1.0 E.U./dL   CBC Auto Differential    Collection Time: 06/20/18  2:52 AM   Result Value Ref Range    WBC 13.71 (H) 3.50 - 10.80 10*3/mm3    RBC 4.77 3.89 - 5.14 10*6/mm3    Hemoglobin 14.1 11.5 - 15.5 g/dL    Hematocrit 42.6 34.5 - 44.0 %    MCV 89.3 80.0 - 99.0 fL    MCH 29.6 27.0 - 31.0 pg    MCHC 33.1 32.0 - 36.0 g/dL    RDW 14.2 11.3 - 14.5 %    RDW-SD 46.3 37.0 - 54.0 fl    MPV 11.4 6.0 - 12.0 fL    Platelets 234 150 - 450 10*3/mm3    Neutrophil % 51.1 41.0 - 71.0 %    Lymphocyte % 38.3 24.0 - 44.0 %    Monocyte % 6.9 0.0 - 12.0 %    Eosinophil % 3.4 (H) 0.0 - 3.0 %    Basophil % 0.3 0.0 - 1.0 %    Immature Grans % 0.4 0.0 - 0.6 %    Neutrophils, Absolute 7.00 1.50 - 8.30 10*3/mm3    Lymphocytes, Absolute 5.25 (H) 0.60 - 4.80 10*3/mm3    Monocytes, Absolute 0.95 0.00 - 1.00 10*3/mm3    Eosinophils, Absolute 0.47 (H) 0.00 - 0.30 10*3/mm3    Basophils, Absolute 0.04 0.00 - 0.20 10*3/mm3    Immature Grans, Absolute 0.05 (H) 0.00 - 0.03 10*3/mm3   POCT Pregnancy, Urine    Collection  "Time: 06/20/18  3:02 AM   Result Value Ref Range    HCG, Urine, QL Negative Negative    Lot Number [id number]     Internal Positive Control Positive     Internal Negative Control Negative    Comprehensive Metabolic Panel    Collection Time: 06/20/18  4:04 AM   Result Value Ref Range    Glucose 132 (H) 70 - 100 mg/dL    BUN 9 9 - 23 mg/dL    Creatinine 0.62 0.60 - 1.30 mg/dL    Sodium 140 132 - 146 mmol/L    Potassium 3.8 3.5 - 5.5 mmol/L    Chloride 109 99 - 109 mmol/L    CO2 21.0 20.0 - 31.0 mmol/L    Calcium 9.4 8.7 - 10.4 mg/dL    Total Protein 7.4 5.7 - 8.2 g/dL    Albumin 4.34 3.20 - 4.80 g/dL    ALT (SGPT) 81 (H) 7 - 40 U/L    AST (SGOT) 78 (H) 0 - 33 U/L    Alkaline Phosphatase 122 (H) 25 - 100 U/L    Total Bilirubin 0.5 0.3 - 1.2 mg/dL    eGFR Non African Amer 105 >60 mL/min/1.73    Globulin 3.1 gm/dL    A/G Ratio 1.4 (L) 1.5 - 2.5 g/dL    BUN/Creatinine Ratio 14.5 7.0 - 25.0    Anion Gap 10.0 3.0 - 11.0 mmol/L   Lipase    Collection Time: 06/20/18  4:04 AM   Result Value Ref Range    Lipase 57 (H) 6 - 51 U/L     Note: In addition to lab results from this visit, the labs listed above may include labs taken at another facility or during a different encounter within the last 24 hours. Please correlate lab times with ED admission and discharge times for further clarification of the services performed during this visit.    CT Abdomen Pelvis With Contrast   Final Result     No acute findings visualized in the abdomen or pelvis.      THIS DOCUMENT HAS BEEN ELECTRONICALLY SIGNED BY REY FORD MD        Vitals:    06/20/18 0214 06/20/18 0223   BP: 152/91    BP Location: Left arm    Patient Position: Sitting    Pulse: 106    Resp:  20   Temp: 98.7 °F (37.1 °C)    TempSrc: Oral    SpO2: 97%    Weight: 127 kg (280 lb)    Height: 167.6 cm (66\")      Medications   sodium chloride 0.9 % flush 10 mL (not administered)   sodium chloride 0.9 % bolus 1,000 mL (0 mL Intravenous Stopped 6/20/18 0411)   Morphine sulfate (PF) " injection 4 mg (4 mg Intravenous Given 6/20/18 5183)   ondansetron (ZOFRAN) injection 4 mg (4 mg Intravenous Given 6/20/18 0933)   iopamidol (ISOVUE-300) 61 % injection 100 mL (100 mL Intravenous Given 6/20/18 4645)   Morphine sulfate (PF) injection 4 mg (4 mg Intravenous Given 6/20/18 4259)   ketorolac (TORADOL) injection 30 mg (30 mg Intravenous Given 6/20/18 2894)     ECG/EMG Results (last 24 hours)     ** No results found for the last 24 hours. **                  [facility]      Final diagnoses:   Right lower quadrant abdominal pain            Sandee C Devin, APRN  06/20/18 2034

## 2022-04-25 NOTE — TELEPHONE ENCOUNTER
Can we get an order for the steroid pack?   Patient Education     Low-Salt Choices  Eating salt (sodium) can make your body retain too much water. Extra water makes your heart work harder. Canned, packaged, and frozen foods are easy to prepare. But they are often high in sodium. Here are some ideas for low-salt foods you can easily make yourself.     For breakfast  · Fruit or 100% fruit juice. It's better to have whole fruit instead of 100% fruit juice.  · Whole-wheat bread or an English muffin. Look for sodium content on Nutrition Facts labels.  · Low-fat milk or yogurt  · Unsalted eggs  · Shredded wheat  · Corn tortillas  · Unsalted steamed rice  · Regular (not instant) hot cereal, made without salt  Stay away from  · Sausage, dubois, and ham  · Flour tortillas  · Packaged muffins, pancakes, and biscuits  · Instant hot cereals  · Cottage cheese    For lunch and dinner  · Fresh fish, chicken, turkey, or meat--baked, broiled, or roasted without salt  · Dry beans, cooked without salt  · Tofu, stir-fried without salt  · Unsalted fresh fruit and vegetables, or frozen or canned fruit and vegetables with no added salt  Stay away from  · Lunch or deli meat that is cured or smoked  · Cheese  · Tomato juice and ketchup  · Canned vegetables, soups, and fish not labeled as no-salt-added or reduced sodium  · Packaged gravies and sauces  · Olives, pickles, and relish  · Bottled salad dressings    For snacks and desserts  · Yogurt  · Unsalted, air-popped popcorn  · Unsalted nuts or seeds  Stay away from  · Pies and cakes  · Packaged dessert mixes  · Pizza  · Canned and packaged puddings  · Pretzels, chips, crackers, and nuts--unless the label says unsalted    [employer] last reviewed this educational content on 11/1/2019  © 1926-8219 [employer]. [address] [place] [address], [place] [address]. All rights reserved. This information is not intended as a substitute for professional medical care. Always follow your healthcare professional's instructions.

## 2024-09-02 ENCOUNTER — APPOINTMENT (OUTPATIENT)
Facility: HOSPITAL | Age: 49
End: 2024-09-02
Payer: COMMERCIAL

## 2024-09-02 ENCOUNTER — HOSPITAL ENCOUNTER (INPATIENT)
Facility: HOSPITAL | Age: 49
LOS: 1 days | Discharge: LEFT BEFORE DISCHARGE | End: 2024-09-03
Attending: EMERGENCY MEDICINE | Admitting: STUDENT IN AN ORGANIZED HEALTH CARE EDUCATION/TRAINING PROGRAM
Payer: COMMERCIAL

## 2024-09-02 DIAGNOSIS — I1A.0 RESISTANT HYPERTENSION: Primary | ICD-10-CM

## 2024-09-02 DIAGNOSIS — F11.93 OPIOID WITHDRAWAL: ICD-10-CM

## 2024-09-02 LAB
ALBUMIN SERPL-MCNC: 4.6 G/DL (ref 3.5–5.2)
ALBUMIN/GLOB SERPL: 1.3 G/DL
ALP SERPL-CCNC: 85 U/L (ref 39–117)
ALT SERPL W P-5'-P-CCNC: 7 U/L (ref 1–33)
AMPHET+METHAMPHET UR QL: NEGATIVE
AMPHETAMINES UR QL: NEGATIVE
ANION GAP SERPL CALCULATED.3IONS-SCNC: 16.8 MMOL/L (ref 5–15)
AST SERPL-CCNC: 28 U/L (ref 1–32)
BACTERIA UR QL AUTO: ABNORMAL /HPF
BARBITURATES UR QL SCN: NEGATIVE
BASOPHILS # BLD AUTO: 0.01 10*3/MM3 (ref 0–0.2)
BASOPHILS NFR BLD AUTO: 0.1 % (ref 0–1.5)
BENZODIAZ UR QL SCN: POSITIVE
BILIRUB SERPL-MCNC: 0.9 MG/DL (ref 0–1.2)
BILIRUB UR QL STRIP: NEGATIVE
BUN SERPL-MCNC: 8 MG/DL (ref 6–20)
BUN/CREAT SERPL: 13.1 (ref 7–25)
BUPRENORPHINE SERPL-MCNC: NEGATIVE NG/ML
CALCIUM SPEC-SCNC: 9.7 MG/DL (ref 8.6–10.5)
CANNABINOIDS SERPL QL: NEGATIVE
CHLORIDE SERPL-SCNC: 97 MMOL/L (ref 98–107)
CLARITY UR: CLEAR
CO2 SERPL-SCNC: 23.2 MMOL/L (ref 22–29)
COCAINE UR QL: NEGATIVE
COLOR UR: YELLOW
CREAT SERPL-MCNC: 0.61 MG/DL (ref 0.57–1)
DEPRECATED RDW RBC AUTO: 40 FL (ref 37–54)
EGFRCR SERPLBLD CKD-EPI 2021: 109.8 ML/MIN/1.73
EOSINOPHIL # BLD AUTO: 0.01 10*3/MM3 (ref 0–0.4)
EOSINOPHIL NFR BLD AUTO: 0.1 % (ref 0.3–6.2)
ERYTHROCYTE [DISTWIDTH] IN BLOOD BY AUTOMATED COUNT: 12.8 % (ref 12.3–15.4)
FENTANYL UR-MCNC: POSITIVE NG/ML
GLOBULIN UR ELPH-MCNC: 3.5 GM/DL
GLUCOSE SERPL-MCNC: 181 MG/DL (ref 65–99)
GLUCOSE UR STRIP-MCNC: ABNORMAL MG/DL
HCT VFR BLD AUTO: 49.1 % (ref 34–46.6)
HGB BLD-MCNC: 16.8 G/DL (ref 12–15.9)
HGB UR QL STRIP.AUTO: ABNORMAL
HOLD SPECIMEN: NORMAL
HYALINE CASTS UR QL AUTO: ABNORMAL /LPF
IMM GRANULOCYTES # BLD AUTO: 0.02 10*3/MM3 (ref 0–0.05)
IMM GRANULOCYTES NFR BLD AUTO: 0.2 % (ref 0–0.5)
KETONES UR QL STRIP: ABNORMAL
LEUKOCYTE ESTERASE UR QL STRIP.AUTO: NEGATIVE
LIPASE SERPL-CCNC: 20 U/L (ref 13–60)
LYMPHOCYTES # BLD AUTO: 1.81 10*3/MM3 (ref 0.7–3.1)
LYMPHOCYTES NFR BLD AUTO: 13.9 % (ref 19.6–45.3)
MCH RBC QN AUTO: 29 PG (ref 26.6–33)
MCHC RBC AUTO-ENTMCNC: 34.2 G/DL (ref 31.5–35.7)
MCV RBC AUTO: 84.8 FL (ref 79–97)
METHADONE UR QL SCN: NEGATIVE
MONOCYTES # BLD AUTO: 0.49 10*3/MM3 (ref 0.1–0.9)
MONOCYTES NFR BLD AUTO: 3.8 % (ref 5–12)
MUCOUS THREADS URNS QL MICRO: ABNORMAL /HPF
NEUTROPHILS NFR BLD AUTO: 10.68 10*3/MM3 (ref 1.7–7)
NEUTROPHILS NFR BLD AUTO: 81.9 % (ref 42.7–76)
NITRITE UR QL STRIP: NEGATIVE
OPIATES UR QL: NEGATIVE
OXYCODONE UR QL SCN: NEGATIVE
PCP UR QL SCN: NEGATIVE
PH UR STRIP.AUTO: 7.5 [PH] (ref 5–8)
PLATELET # BLD AUTO: 233 10*3/MM3 (ref 140–450)
PMV BLD AUTO: 10.9 FL (ref 6–12)
POTASSIUM SERPL-SCNC: 4 MMOL/L (ref 3.5–5.2)
PROT SERPL-MCNC: 8.1 G/DL (ref 6–8.5)
PROT UR QL STRIP: ABNORMAL
RBC # BLD AUTO: 5.79 10*6/MM3 (ref 3.77–5.28)
RBC # UR STRIP: ABNORMAL /HPF
REF LAB TEST METHOD: ABNORMAL
SODIUM SERPL-SCNC: 137 MMOL/L (ref 136–145)
SP GR UR STRIP: 1.02 (ref 1–1.03)
SQUAMOUS #/AREA URNS HPF: ABNORMAL /HPF
TRICYCLICS UR QL SCN: NEGATIVE
UROBILINOGEN UR QL STRIP: ABNORMAL
WBC # UR STRIP: ABNORMAL /HPF
WBC NRBC COR # BLD AUTO: 13.02 10*3/MM3 (ref 3.4–10.8)
WHOLE BLOOD HOLD COAG: NORMAL
WHOLE BLOOD HOLD SPECIMEN: NORMAL

## 2024-09-02 PROCEDURE — 25010000002 KETOROLAC TROMETHAMINE PER 15 MG: Performed by: EMERGENCY MEDICINE

## 2024-09-02 PROCEDURE — 80307 DRUG TEST PRSMV CHEM ANLYZR: CPT | Performed by: EMERGENCY MEDICINE

## 2024-09-02 PROCEDURE — 25810000003 SODIUM CHLORIDE 0.9 % SOLUTION: Performed by: EMERGENCY MEDICINE

## 2024-09-02 PROCEDURE — 93005 ELECTROCARDIOGRAM TRACING: CPT | Performed by: EMERGENCY MEDICINE

## 2024-09-02 PROCEDURE — 74177 CT ABD & PELVIS W/CONTRAST: CPT

## 2024-09-02 PROCEDURE — 81001 URINALYSIS AUTO W/SCOPE: CPT | Performed by: EMERGENCY MEDICINE

## 2024-09-02 PROCEDURE — 25010000002 DROPERIDOL PER 5 MG: Performed by: EMERGENCY MEDICINE

## 2024-09-02 PROCEDURE — 83690 ASSAY OF LIPASE: CPT | Performed by: EMERGENCY MEDICINE

## 2024-09-02 PROCEDURE — 80053 COMPREHEN METABOLIC PANEL: CPT | Performed by: EMERGENCY MEDICINE

## 2024-09-02 PROCEDURE — 25810000003 SODIUM CHLORIDE 0.9 % SOLUTION 250 ML FLEX CONT: Performed by: EMERGENCY MEDICINE

## 2024-09-02 PROCEDURE — 87899 AGENT NOS ASSAY W/OPTIC: CPT | Performed by: NURSE PRACTITIONER

## 2024-09-02 PROCEDURE — 99223 1ST HOSP IP/OBS HIGH 75: CPT | Performed by: STUDENT IN AN ORGANIZED HEALTH CARE EDUCATION/TRAINING PROGRAM

## 2024-09-02 PROCEDURE — 25010000002 MIDAZOLAM PER 1 MG: Performed by: EMERGENCY MEDICINE

## 2024-09-02 PROCEDURE — 87449 NOS EACH ORGANISM AG IA: CPT | Performed by: NURSE PRACTITIONER

## 2024-09-02 PROCEDURE — 25010000002 NICARDIPINE 2.5 MG/ML SOLUTION 10 ML VIAL: Performed by: EMERGENCY MEDICINE

## 2024-09-02 PROCEDURE — 25510000001 IOPAMIDOL 61 % SOLUTION: Performed by: EMERGENCY MEDICINE

## 2024-09-02 PROCEDURE — G0378 HOSPITAL OBSERVATION PER HR: HCPCS

## 2024-09-02 PROCEDURE — 99285 EMERGENCY DEPT VISIT HI MDM: CPT

## 2024-09-02 PROCEDURE — 25810000003 SODIUM CHLORIDE 0.9 % SOLUTION: Performed by: STUDENT IN AN ORGANIZED HEALTH CARE EDUCATION/TRAINING PROGRAM

## 2024-09-02 PROCEDURE — 93005 ELECTROCARDIOGRAM TRACING: CPT

## 2024-09-02 PROCEDURE — 25010000002 HYDRALAZINE PER 20 MG: Performed by: EMERGENCY MEDICINE

## 2024-09-02 PROCEDURE — 85025 COMPLETE CBC W/AUTO DIFF WBC: CPT | Performed by: EMERGENCY MEDICINE

## 2024-09-02 RX ORDER — CLONIDINE HYDROCHLORIDE 0.1 MG/1
0.1 TABLET ORAL ONCE
Status: COMPLETED | OUTPATIENT
Start: 2024-09-02 | End: 2024-09-02

## 2024-09-02 RX ORDER — HYDRALAZINE HYDROCHLORIDE 20 MG/ML
20 INJECTION INTRAMUSCULAR; INTRAVENOUS ONCE
Status: COMPLETED | OUTPATIENT
Start: 2024-09-02 | End: 2024-09-02

## 2024-09-02 RX ORDER — SODIUM CHLORIDE 0.9 % (FLUSH) 0.9 %
10 SYRINGE (ML) INJECTION EVERY 12 HOURS SCHEDULED
Status: DISCONTINUED | OUTPATIENT
Start: 2024-09-03 | End: 2024-09-03 | Stop reason: HOSPADM

## 2024-09-02 RX ORDER — PANTOPRAZOLE SODIUM 40 MG/10ML
40 INJECTION, POWDER, LYOPHILIZED, FOR SOLUTION INTRAVENOUS
Status: DISCONTINUED | OUTPATIENT
Start: 2024-09-03 | End: 2024-09-03 | Stop reason: HOSPADM

## 2024-09-02 RX ORDER — SODIUM CHLORIDE 0.9 % (FLUSH) 0.9 %
10 SYRINGE (ML) INJECTION AS NEEDED
Status: DISCONTINUED | OUTPATIENT
Start: 2024-09-02 | End: 2024-09-03 | Stop reason: HOSPADM

## 2024-09-02 RX ORDER — NICOTINE POLACRILEX 4 MG
15 LOZENGE BUCCAL
Status: DISCONTINUED | OUTPATIENT
Start: 2024-09-02 | End: 2024-09-03 | Stop reason: HOSPADM

## 2024-09-02 RX ORDER — DEXTROSE MONOHYDRATE 25 G/50ML
25 INJECTION, SOLUTION INTRAVENOUS
Status: DISCONTINUED | OUTPATIENT
Start: 2024-09-02 | End: 2024-09-03 | Stop reason: HOSPADM

## 2024-09-02 RX ORDER — KETOROLAC TROMETHAMINE 15 MG/ML
15 INJECTION, SOLUTION INTRAMUSCULAR; INTRAVENOUS ONCE
Status: COMPLETED | OUTPATIENT
Start: 2024-09-02 | End: 2024-09-02

## 2024-09-02 RX ORDER — DROPERIDOL 2.5 MG/ML
2.5 INJECTION, SOLUTION INTRAMUSCULAR; INTRAVENOUS ONCE
Status: COMPLETED | OUTPATIENT
Start: 2024-09-02 | End: 2024-09-02

## 2024-09-02 RX ORDER — MIDAZOLAM HYDROCHLORIDE 1 MG/ML
2 INJECTION INTRAMUSCULAR; INTRAVENOUS ONCE
Status: COMPLETED | OUTPATIENT
Start: 2024-09-02 | End: 2024-09-02

## 2024-09-02 RX ORDER — BISACODYL 10 MG
10 SUPPOSITORY, RECTAL RECTAL DAILY PRN
Status: DISCONTINUED | OUTPATIENT
Start: 2024-09-02 | End: 2024-09-03 | Stop reason: HOSPADM

## 2024-09-02 RX ORDER — IOPAMIDOL 612 MG/ML
100 INJECTION, SOLUTION INTRAVASCULAR
Status: COMPLETED | OUTPATIENT
Start: 2024-09-02 | End: 2024-09-02

## 2024-09-02 RX ORDER — CLONIDINE HYDROCHLORIDE 0.1 MG/1
0.2 TABLET ORAL ONCE
Status: COMPLETED | OUTPATIENT
Start: 2024-09-02 | End: 2024-09-02

## 2024-09-02 RX ORDER — IBUPROFEN 600 MG/1
1 TABLET ORAL
Status: DISCONTINUED | OUTPATIENT
Start: 2024-09-02 | End: 2024-09-03 | Stop reason: HOSPADM

## 2024-09-02 RX ORDER — BISACODYL 5 MG/1
5 TABLET, DELAYED RELEASE ORAL DAILY PRN
Status: DISCONTINUED | OUTPATIENT
Start: 2024-09-02 | End: 2024-09-03 | Stop reason: HOSPADM

## 2024-09-02 RX ORDER — HYDRALAZINE HYDROCHLORIDE 20 MG/ML
20 INJECTION INTRAMUSCULAR; INTRAVENOUS ONCE
Status: DISCONTINUED | OUTPATIENT
Start: 2024-09-02 | End: 2024-09-02

## 2024-09-02 RX ORDER — SODIUM CHLORIDE 9 MG/ML
40 INJECTION, SOLUTION INTRAVENOUS AS NEEDED
Status: DISCONTINUED | OUTPATIENT
Start: 2024-09-02 | End: 2024-09-03 | Stop reason: HOSPADM

## 2024-09-02 RX ORDER — AMOXICILLIN 250 MG
2 CAPSULE ORAL 2 TIMES DAILY PRN
Status: DISCONTINUED | OUTPATIENT
Start: 2024-09-02 | End: 2024-09-03 | Stop reason: HOSPADM

## 2024-09-02 RX ORDER — SODIUM CHLORIDE 9 MG/ML
10 INJECTION, SOLUTION INTRAMUSCULAR; INTRAVENOUS; SUBCUTANEOUS AS NEEDED
Status: DISCONTINUED | OUTPATIENT
Start: 2024-09-02 | End: 2024-09-03 | Stop reason: HOSPADM

## 2024-09-02 RX ORDER — POLYETHYLENE GLYCOL 3350 17 G/17G
17 POWDER, FOR SOLUTION ORAL DAILY PRN
Status: DISCONTINUED | OUTPATIENT
Start: 2024-09-02 | End: 2024-09-03 | Stop reason: HOSPADM

## 2024-09-02 RX ADMIN — MIDAZOLAM 2 MG: 1 INJECTION INTRAMUSCULAR; INTRAVENOUS at 21:30

## 2024-09-02 RX ADMIN — SODIUM CHLORIDE 1000 ML: 9 INJECTION, SOLUTION INTRAVENOUS at 15:52

## 2024-09-02 RX ADMIN — HYDRALAZINE HYDROCHLORIDE 20 MG: 20 INJECTION INTRAMUSCULAR; INTRAVENOUS at 20:08

## 2024-09-02 RX ADMIN — DROPERIDOL 2.5 MG: 2.5 INJECTION, SOLUTION INTRAMUSCULAR; INTRAVENOUS at 16:30

## 2024-09-02 RX ADMIN — SODIUM CHLORIDE 1000 ML: 9 INJECTION, SOLUTION INTRAVENOUS at 17:17

## 2024-09-02 RX ADMIN — KETOROLAC TROMETHAMINE 15 MG: 15 INJECTION, SOLUTION INTRAMUSCULAR; INTRAVENOUS at 20:57

## 2024-09-02 RX ADMIN — IOPAMIDOL 100 ML: 612 INJECTION, SOLUTION INTRAVENOUS at 19:34

## 2024-09-02 RX ADMIN — CLONIDINE HYDROCHLORIDE 0.2 MG: 0.1 TABLET ORAL at 18:00

## 2024-09-02 RX ADMIN — NICARDIPINE HYDROCHLORIDE 5 MG/HR: 2.5 INJECTION, SOLUTION INTRAVENOUS at 20:41

## 2024-09-02 RX ADMIN — SODIUM CHLORIDE 1000 ML: 9 INJECTION, SOLUTION INTRAVENOUS at 23:45

## 2024-09-02 RX ADMIN — CLONIDINE HYDROCHLORIDE 0.1 MG: 0.1 TABLET ORAL at 15:52

## 2024-09-02 RX ADMIN — KETOROLAC TROMETHAMINE 15 MG: 15 INJECTION, SOLUTION INTRAMUSCULAR; INTRAVENOUS at 18:00

## 2024-09-02 NOTE — FSED PROVIDER NOTE
"Subjective   History of Present Illness  Patient presents to the emergency department for abdominal pain and abnormal vital signs.  She states that she was going to the [facility] to receive detox for heroin and they noticed that her vitals were abnormal so sent her to the hospital for evaluation.  Says she last snorted heroin yesterday.  Complains of abdominal pain and nausea no diarrhea.  Has withdrawn before.  She denies any blood in her vomit or stool.    History provided by:  Patient   used: No        Review of Systems   Gastrointestinal:  Positive for abdominal pain.   All other systems reviewed and are negative.      Past Medical History:   Diagnosis Date    Heard esophagus     Chronic pain     Diabetes mellitus     Fibromyalgia     Fracture of coccyx     Heartburn     Hepatitis C     History of sinus bradycardia     RONQUILLO (nonalcoholic steatohepatitis)     Obesity     Osteoporosis     Reflex sympathetic dystrophy     Rheumatoid factor positive        Allergies   Allergen Reactions    Lyrica [Pregabalin] Other (See Comments) and Mental Status Change     Blisters in mouth    Reglan [Metoclopramide] Other (See Comments)     IV administration - pt states she \"felt really weird\"       Past Surgical History:   Procedure Laterality Date    BACK SURGERY      CARDIAC CATHETERIZATION      CHOLECYSTECTOMY      DILATATION AND CURETTAGE      FOOT SURGERY      HYSTERECTOMY      INTERVENTIONAL RADIOLOGY PROCEDURE N/A 2/19/2018    Procedure: myelogram lumbar spine;  Surgeon: Octaviano Breen MD;  Location: [facility] INVASIVE LOCATION;  Service:     LIVER BIOPSY      UPPER GASTROINTESTINAL ENDOSCOPY      diagnostic       Family History   Problem Relation Age of Onset    COPD Mother     Asthma Mother     Heart disease Father        Social History     Socioeconomic History    Marital status:    Tobacco Use    Smoking status: Every Day     Current packs/day: 1.00     Types: Cigarettes    Smokeless " tobacco: Never   Substance and Sexual Activity    Alcohol use: No    Drug use: No    Sexual activity: Defer           Objective   Physical Exam  Vitals and nursing note reviewed.   Constitutional:       Appearance: She is well-developed. She is obese.   HENT:      Head: Normocephalic and atraumatic.      Mouth/Throat:      Mouth: Mucous membranes are moist.      Pharynx: Oropharynx is clear.   Eyes:      Extraocular Movements: Extraocular movements intact.      Pupils: Pupils are equal, round, and reactive to light.   Cardiovascular:      Rate and Rhythm: Normal rate and regular rhythm.      Heart sounds: Normal heart sounds.   Pulmonary:      Effort: Pulmonary effort is normal.      Breath sounds: Normal breath sounds.   Abdominal:      Palpations: Abdomen is soft. There is no shifting dullness, fluid wave, hepatomegaly or splenomegaly.      Tenderness: There is no abdominal tenderness. There is no right CVA tenderness, left CVA tenderness, guarding or rebound.   Skin:     General: Skin is warm and dry.      Capillary Refill: Capillary refill takes less than 2 seconds.   Neurological:      General: No focal deficit present.      Mental Status: She is oriented to person, place, and time.      Cranial Nerves: No cranial nerve deficit.   Psychiatric:         Mood and Affect: Mood normal. Mood is anxious.         Behavior: Behavior normal.         Procedures           ED Course                                           Medical Decision Making  I assumed care of this patient at checkout.  The patient was found to be in improved condition.  Her blood pressure did somewhat fluctuate.  Her nausea was controlled with droperidol.  Patient continued to have significantly elevated blood pressure.  She was then given a second dose of clonidine.  Blood pressure remained elevated and was given 20 of hydralazine.    I assumed care again.  She continues to be profoundly hypertensive despite multiple antihypertensive medicines.   Decision to start on nicardipine drip.  I spoke with hospital medicine Dr. Davies at [facility] who will accept her in transfer for further evaluation    Problems Addressed:  Opioid withdrawal: complicated acute illness or injury  Resistant hypertension: complicated acute illness or injury    Amount and/or Complexity of Data Reviewed  Labs: ordered. Decision-making details documented in ED Course.  Radiology: ordered. Decision-making details documented in ED Course.  ECG/medicine tests: ordered. Decision-making details documented in ED Course.    Risk  Prescription drug management.  Decision regarding hospitalization.        Final diagnoses:   Resistant hypertension   Opioid withdrawal       ED Disposition  ED Disposition       ED Disposition   Decision to Admit    Condition   --    Comment   Level of Care: Telemetry [5]   Accepting Provider:: POORNIMA DAVIES [[id number]]                 No follow-up provider specified.       Medication List      No changes were made to your prescriptions during this visit.

## 2024-09-03 ENCOUNTER — APPOINTMENT (OUTPATIENT)
Dept: GENERAL RADIOLOGY | Facility: HOSPITAL | Age: 49
End: 2024-09-03
Payer: COMMERCIAL

## 2024-09-03 ENCOUNTER — APPOINTMENT (OUTPATIENT)
Dept: CT IMAGING | Facility: HOSPITAL | Age: 49
End: 2024-09-03
Payer: COMMERCIAL

## 2024-09-03 ENCOUNTER — APPOINTMENT (OUTPATIENT)
Dept: CARDIOLOGY | Facility: HOSPITAL | Age: 49
End: 2024-09-03
Payer: COMMERCIAL

## 2024-09-03 VITALS
DIASTOLIC BLOOD PRESSURE: 105 MMHG | TEMPERATURE: 97.6 F | HEART RATE: 97 BPM | HEIGHT: 66 IN | OXYGEN SATURATION: 98 % | WEIGHT: 250 LBS | BODY MASS INDEX: 40.18 KG/M2 | RESPIRATION RATE: 20 BRPM | SYSTOLIC BLOOD PRESSURE: 141 MMHG

## 2024-09-03 PROBLEM — IMO0002 UNCONTROLLED TYPE 2 DIABETES MELLITUS WITH COMPLICATION, WITHOUT LONG-TERM CURRENT USE OF INSULIN: Status: RESOLVED | Noted: 2018-03-16 | Resolved: 2024-09-03

## 2024-09-03 PROBLEM — E11.9 TYPE 2 DIABETES MELLITUS: Status: ACTIVE | Noted: 2024-09-03

## 2024-09-03 PROBLEM — F19.10 POLYSUBSTANCE ABUSE: Status: ACTIVE | Noted: 2024-09-03

## 2024-09-03 LAB
ALBUMIN SERPL-MCNC: 4.2 G/DL (ref 3.5–5.2)
ALBUMIN/GLOB SERPL: 1.4 G/DL
ALP SERPL-CCNC: 78 U/L (ref 39–117)
ALT SERPL W P-5'-P-CCNC: 10 U/L (ref 1–33)
AMMONIA BLD-SCNC: 35 UMOL/L (ref 11–51)
ANION GAP SERPL CALCULATED.3IONS-SCNC: 16 MMOL/L (ref 5–15)
ANION GAP SERPL CALCULATED.3IONS-SCNC: 18 MMOL/L (ref 5–15)
ARTERIAL PATENCY WRIST A: ABNORMAL
AST SERPL-CCNC: 15 U/L (ref 1–32)
ATMOSPHERIC PRESS: ABNORMAL MM[HG]
B PARAPERT DNA SPEC QL NAA+PROBE: NOT DETECTED
B PERT DNA SPEC QL NAA+PROBE: NOT DETECTED
B-HCG UR QL: NEGATIVE
BASE EXCESS BLDA CALC-SCNC: -1.7 MMOL/L (ref 0–2)
BDY SITE: ABNORMAL
BH CV ECHO MEAS - AO MAX PG: 7.2 MMHG
BH CV ECHO MEAS - AO MEAN PG: 4 MMHG
BH CV ECHO MEAS - AO ROOT DIAM: 3.5 CM
BH CV ECHO MEAS - AO V2 MAX: 134 CM/SEC
BH CV ECHO MEAS - AO V2 VTI: 20.6 CM
BH CV ECHO MEAS - AVA(I,D): 2.35 CM2
BH CV ECHO MEAS - EDV(CUBED): 46.7 ML
BH CV ECHO MEAS - EDV(MOD-SP2): 109 ML
BH CV ECHO MEAS - EDV(MOD-SP4): 107 ML
BH CV ECHO MEAS - EF(MOD-BP): 65.4 %
BH CV ECHO MEAS - EF(MOD-SP2): 65.8 %
BH CV ECHO MEAS - EF(MOD-SP4): 63.4 %
BH CV ECHO MEAS - ESV(CUBED): 15.6 ML
BH CV ECHO MEAS - ESV(MOD-SP2): 37.3 ML
BH CV ECHO MEAS - ESV(MOD-SP4): 39.2 ML
BH CV ECHO MEAS - FS: 30.6 %
BH CV ECHO MEAS - IVS/LVPW: 1.13 CM
BH CV ECHO MEAS - IVSD: 0.9 CM
BH CV ECHO MEAS - LA DIMENSION: 3.2 CM
BH CV ECHO MEAS - LAT PEAK E' VEL: 9.7 CM/SEC
BH CV ECHO MEAS - LV MASS(C)D: 85.6 GRAMS
BH CV ECHO MEAS - LV MAX PG: 3.9 MMHG
BH CV ECHO MEAS - LV MEAN PG: 2 MMHG
BH CV ECHO MEAS - LV V1 MAX: 98.1 CM/SEC
BH CV ECHO MEAS - LV V1 VTI: 15.4 CM
BH CV ECHO MEAS - LVIDD: 3.6 CM
BH CV ECHO MEAS - LVIDS: 2.5 CM
BH CV ECHO MEAS - LVOT AREA: 3.1 CM2
BH CV ECHO MEAS - LVOT DIAM: 2 CM
BH CV ECHO MEAS - LVPWD: 0.8 CM
BH CV ECHO MEAS - MED PEAK E' VEL: 6.4 CM/SEC
BH CV ECHO MEAS - MV A MAX VEL: 119 CM/SEC
BH CV ECHO MEAS - MV DEC SLOPE: 401 CM/SEC2
BH CV ECHO MEAS - MV DEC TIME: 0.33 SEC
BH CV ECHO MEAS - MV E MAX VEL: 80.4 CM/SEC
BH CV ECHO MEAS - MV E/A: 0.68
BH CV ECHO MEAS - MV MAX PG: 4 MMHG
BH CV ECHO MEAS - MV MEAN PG: 2 MMHG
BH CV ECHO MEAS - MV P1/2T: 48.7 MSEC
BH CV ECHO MEAS - MV V2 VTI: 13.2 CM
BH CV ECHO MEAS - MVA(P1/2T): 4.5 CM2
BH CV ECHO MEAS - MVA(VTI): 3.7 CM2
BH CV ECHO MEAS - PA ACC TIME: 0.09 SEC
BH CV ECHO MEAS - PA V2 MAX: 103 CM/SEC
BH CV ECHO MEAS - SV(LVOT): 48.2 ML
BH CV ECHO MEAS - SV(MOD-SP2): 71.7 ML
BH CV ECHO MEAS - SV(MOD-SP4): 67.8 ML
BH CV ECHO MEAS - TAPSE (>1.6): 1.74 CM
BH CV ECHO MEASUREMENTS AVERAGE E/E' RATIO: 9.99
BH CV VAS BP LEFT ARM: NORMAL MMHG
BH CV XLRA - RV BASE: 2.7 CM
BH CV XLRA - RV LENGTH: 6.7 CM
BH CV XLRA - RV MID: 2 CM
BH CV XLRA - TDI S': 12.7 CM/SEC
BILIRUB SERPL-MCNC: 0.7 MG/DL (ref 0–1.2)
BODY TEMPERATURE: 37
BUN SERPL-MCNC: 7 MG/DL (ref 6–20)
BUN SERPL-MCNC: 8 MG/DL (ref 6–20)
BUN/CREAT SERPL: 10.9 (ref 7–25)
BUN/CREAT SERPL: 14 (ref 7–25)
C PNEUM DNA NPH QL NAA+NON-PROBE: NOT DETECTED
CALCIUM SPEC-SCNC: 8.6 MG/DL (ref 8.6–10.5)
CALCIUM SPEC-SCNC: 8.7 MG/DL (ref 8.6–10.5)
CHLORIDE SERPL-SCNC: 98 MMOL/L (ref 98–107)
CHLORIDE SERPL-SCNC: 98 MMOL/L (ref 98–107)
CO2 BLDA-SCNC: 22.3 MMOL/L (ref 22–33)
CO2 SERPL-SCNC: 18 MMOL/L (ref 22–29)
CO2 SERPL-SCNC: 20 MMOL/L (ref 22–29)
COHGB MFR BLD: 1.3 % (ref 0–2)
CREAT SERPL-MCNC: 0.57 MG/DL (ref 0.57–1)
CREAT SERPL-MCNC: 0.64 MG/DL (ref 0.57–1)
D DIMER PPP FEU-MCNC: 1.22 MCGFEU/ML (ref 0–0.5)
D-LACTATE SERPL-SCNC: 2 MMOL/L (ref 0.5–2)
DEPRECATED RDW RBC AUTO: 40.5 FL (ref 37–54)
EGFRCR SERPLBLD CKD-EPI 2021: 108.5 ML/MIN/1.73
EGFRCR SERPLBLD CKD-EPI 2021: 111.6 ML/MIN/1.73
EPAP: 0
ERYTHROCYTE [DISTWIDTH] IN BLOOD BY AUTOMATED COUNT: 13.1 % (ref 12.3–15.4)
FLUAV SUBTYP SPEC NAA+PROBE: NOT DETECTED
FLUBV RNA ISLT QL NAA+PROBE: NOT DETECTED
GEN 5 2HR TROPONIN T REFLEX: 20 NG/L
GLOBULIN UR ELPH-MCNC: 3 GM/DL
GLUCOSE BLDC GLUCOMTR-MCNC: 153 MG/DL (ref 70–130)
GLUCOSE BLDC GLUCOMTR-MCNC: 175 MG/DL (ref 70–130)
GLUCOSE BLDC GLUCOMTR-MCNC: 188 MG/DL (ref 70–130)
GLUCOSE BLDC GLUCOMTR-MCNC: 192 MG/DL (ref 70–130)
GLUCOSE SERPL-MCNC: 179 MG/DL (ref 65–99)
GLUCOSE SERPL-MCNC: 181 MG/DL (ref 65–99)
HADV DNA SPEC NAA+PROBE: NOT DETECTED
HCO3 BLDA-SCNC: 21.3 MMOL/L (ref 20–26)
HCOV 229E RNA SPEC QL NAA+PROBE: NOT DETECTED
HCOV HKU1 RNA SPEC QL NAA+PROBE: NOT DETECTED
HCOV NL63 RNA SPEC QL NAA+PROBE: NOT DETECTED
HCOV OC43 RNA SPEC QL NAA+PROBE: NOT DETECTED
HCT VFR BLD AUTO: 46.4 % (ref 34–46.6)
HCT VFR BLD CALC: 48.3 % (ref 38–51)
HGB BLD-MCNC: 16.3 G/DL (ref 12–15.9)
HGB BLDA-MCNC: 15.7 G/DL (ref 14–18)
HIV 1+2 AB+HIV1 P24 AG SERPL QL IA: NORMAL
HMPV RNA NPH QL NAA+NON-PROBE: NOT DETECTED
HPIV1 RNA ISLT QL NAA+PROBE: NOT DETECTED
HPIV2 RNA SPEC QL NAA+PROBE: NOT DETECTED
HPIV3 RNA NPH QL NAA+PROBE: NOT DETECTED
HPIV4 P GENE NPH QL NAA+PROBE: NOT DETECTED
INHALED O2 CONCENTRATION: 32 %
IPAP: 0
L PNEUMO1 AG UR QL IA: NEGATIVE
LEFT ATRIUM VOLUME INDEX: 8.7 ML/M2
M PNEUMO IGG SER IA-ACNC: NOT DETECTED
MAGNESIUM SERPL-MCNC: 1.4 MG/DL (ref 1.6–2.6)
MAGNESIUM SERPL-MCNC: 1.4 MG/DL (ref 1.6–2.6)
MCH RBC QN AUTO: 29.7 PG (ref 26.6–33)
MCHC RBC AUTO-ENTMCNC: 35.1 G/DL (ref 31.5–35.7)
MCV RBC AUTO: 84.7 FL (ref 79–97)
METHGB BLD QL: 0.1 % (ref 0–1.5)
MODALITY: ABNORMAL
MRSA DNA SPEC QL NAA+PROBE: NEGATIVE
NT-PROBNP SERPL-MCNC: 1252 PG/ML (ref 0–450)
OXYHGB MFR BLDV: 94.3 % (ref 94–99)
PAW @ PEAK INSP FLOW SETTING VENT: 0 CMH2O
PCO2 BLDA: 31.3 MM HG (ref 35–45)
PCO2 TEMP ADJ BLD: 31.3 MM HG (ref 35–45)
PH BLDA: 7.44 PH UNITS (ref 7.35–7.45)
PH, TEMP CORRECTED: 7.44 PH UNITS
PHOSPHATE SERPL-MCNC: 2.2 MG/DL (ref 2.5–4.5)
PLATELET # BLD AUTO: 195 10*3/MM3 (ref 140–450)
PMV BLD AUTO: 11.2 FL (ref 6–12)
PO2 BLDA: 71.5 MM HG (ref 83–108)
PO2 TEMP ADJ BLD: 71.5 MM HG (ref 83–108)
POTASSIUM SERPL-SCNC: 3.3 MMOL/L (ref 3.5–5.2)
POTASSIUM SERPL-SCNC: 3.5 MMOL/L (ref 3.5–5.2)
PROCALCITONIN SERPL-MCNC: 0.02 NG/ML (ref 0–0.25)
PROT SERPL-MCNC: 7.2 G/DL (ref 6–8.5)
QT INTERVAL: 458 MS
QTC INTERVAL: 472 MS
RBC # BLD AUTO: 5.48 10*6/MM3 (ref 3.77–5.28)
RHINOVIRUS RNA SPEC NAA+PROBE: NOT DETECTED
RSV RNA NPH QL NAA+NON-PROBE: NOT DETECTED
S PNEUM AG SPEC QL LA: NEGATIVE
SARS-COV-2 RNA NPH QL NAA+NON-PROBE: NOT DETECTED
SODIUM SERPL-SCNC: 134 MMOL/L (ref 136–145)
SODIUM SERPL-SCNC: 134 MMOL/L (ref 136–145)
TOTAL RATE: 0 BREATHS/MINUTE
TROPONIN T DELTA: -2 NG/L
TROPONIN T SERPL HS-MCNC: 22 NG/L
TSH SERPL DL<=0.05 MIU/L-ACNC: 1.23 UIU/ML (ref 0.27–4.2)
WBC NRBC COR # BLD AUTO: 20.47 10*3/MM3 (ref 3.4–10.8)

## 2024-09-03 PROCEDURE — 85379 FIBRIN DEGRADATION QUANT: CPT | Performed by: STUDENT IN AN ORGANIZED HEALTH CARE EDUCATION/TRAINING PROGRAM

## 2024-09-03 PROCEDURE — 87040 BLOOD CULTURE FOR BACTERIA: CPT | Performed by: NURSE PRACTITIONER

## 2024-09-03 PROCEDURE — 83735 ASSAY OF MAGNESIUM: CPT | Performed by: NURSE PRACTITIONER

## 2024-09-03 PROCEDURE — 80048 BASIC METABOLIC PNL TOTAL CA: CPT | Performed by: STUDENT IN AN ORGANIZED HEALTH CARE EDUCATION/TRAINING PROGRAM

## 2024-09-03 PROCEDURE — 71045 X-RAY EXAM CHEST 1 VIEW: CPT

## 2024-09-03 PROCEDURE — 94799 UNLISTED PULMONARY SVC/PX: CPT

## 2024-09-03 PROCEDURE — 83880 ASSAY OF NATRIURETIC PEPTIDE: CPT

## 2024-09-03 PROCEDURE — 84100 ASSAY OF PHOSPHORUS: CPT | Performed by: NURSE PRACTITIONER

## 2024-09-03 PROCEDURE — 25810000003 SODIUM CHLORIDE 0.9 % SOLUTION

## 2024-09-03 PROCEDURE — G0432 EIA HIV-1/HIV-2 SCREEN: HCPCS | Performed by: NURSE PRACTITIONER

## 2024-09-03 PROCEDURE — 84145 PROCALCITONIN (PCT): CPT | Performed by: STUDENT IN AN ORGANIZED HEALTH CARE EDUCATION/TRAINING PROGRAM

## 2024-09-03 PROCEDURE — 93306 TTE W/DOPPLER COMPLETE: CPT | Performed by: INTERNAL MEDICINE

## 2024-09-03 PROCEDURE — 63710000001 INSULIN REGULAR HUMAN PER 5 UNITS: Performed by: STUDENT IN AN ORGANIZED HEALTH CARE EDUCATION/TRAINING PROGRAM

## 2024-09-03 PROCEDURE — C1751 CATH, INF, PER/CENT/MIDLINE: HCPCS

## 2024-09-03 PROCEDURE — 82948 REAGENT STRIP/BLOOD GLUCOSE: CPT

## 2024-09-03 PROCEDURE — 84484 ASSAY OF TROPONIN QUANT: CPT

## 2024-09-03 PROCEDURE — 82140 ASSAY OF AMMONIA: CPT

## 2024-09-03 PROCEDURE — 87641 MR-STAPH DNA AMP PROBE: CPT | Performed by: NURSE PRACTITIONER

## 2024-09-03 PROCEDURE — 82805 BLOOD GASES W/O2 SATURATION: CPT

## 2024-09-03 PROCEDURE — 82375 ASSAY CARBOXYHB QUANT: CPT

## 2024-09-03 PROCEDURE — C1894 INTRO/SHEATH, NON-LASER: HCPCS

## 2024-09-03 PROCEDURE — 80050 GENERAL HEALTH PANEL: CPT | Performed by: STUDENT IN AN ORGANIZED HEALTH CARE EDUCATION/TRAINING PROGRAM

## 2024-09-03 PROCEDURE — 25010000002 MAGNESIUM SULFATE 2 GM/50ML SOLUTION: Performed by: NURSE PRACTITIONER

## 2024-09-03 PROCEDURE — 25010000002 POTASSIUM CHLORIDE 10 MEQ/100ML SOLUTION: Performed by: NURSE PRACTITIONER

## 2024-09-03 PROCEDURE — 70450 CT HEAD/BRAIN W/O DYE: CPT

## 2024-09-03 PROCEDURE — 25810000003 SODIUM CHLORIDE 0.9 % SOLUTION 250 ML FLEX CONT: Performed by: NURSE PRACTITIONER

## 2024-09-03 PROCEDURE — 25010000002 NALOXONE PER 1 MG

## 2024-09-03 PROCEDURE — 02HV33Z INSERTION OF INFUSION DEVICE INTO SUPERIOR VENA CAVA, PERCUTANEOUS APPROACH: ICD-10-PCS | Performed by: STUDENT IN AN ORGANIZED HEALTH CARE EDUCATION/TRAINING PROGRAM

## 2024-09-03 PROCEDURE — 83605 ASSAY OF LACTIC ACID: CPT | Performed by: STUDENT IN AN ORGANIZED HEALTH CARE EDUCATION/TRAINING PROGRAM

## 2024-09-03 PROCEDURE — 25010000002 BUMETANIDE PER 0.5 MG: Performed by: NURSE PRACTITIONER

## 2024-09-03 PROCEDURE — 99291 CRITICAL CARE FIRST HOUR: CPT | Performed by: STUDENT IN AN ORGANIZED HEALTH CARE EDUCATION/TRAINING PROGRAM

## 2024-09-03 PROCEDURE — 25010000002 ENOXAPARIN PER 10 MG: Performed by: NURSE PRACTITIONER

## 2024-09-03 PROCEDURE — 94660 CPAP INITIATION&MGMT: CPT

## 2024-09-03 PROCEDURE — 83735 ASSAY OF MAGNESIUM: CPT | Performed by: STUDENT IN AN ORGANIZED HEALTH CARE EDUCATION/TRAINING PROGRAM

## 2024-09-03 PROCEDURE — 81025 URINE PREGNANCY TEST: CPT | Performed by: STUDENT IN AN ORGANIZED HEALTH CARE EDUCATION/TRAINING PROGRAM

## 2024-09-03 PROCEDURE — 25010000002 THIAMINE PER 100 MG: Performed by: NURSE PRACTITIONER

## 2024-09-03 PROCEDURE — 4A02X4A MEASUREMENT OF CARDIAC ELECTRICAL ACTIVITY, GUIDANCE, EXTERNAL APPROACH: ICD-10-PCS | Performed by: STUDENT IN AN ORGANIZED HEALTH CARE EDUCATION/TRAINING PROGRAM

## 2024-09-03 PROCEDURE — 71250 CT THORAX DX C-: CPT

## 2024-09-03 PROCEDURE — 93306 TTE W/DOPPLER COMPLETE: CPT

## 2024-09-03 PROCEDURE — 25010000002 AZITHROMYCIN PER 500 MG: Performed by: NURSE PRACTITIONER

## 2024-09-03 PROCEDURE — 83050 HGB METHEMOGLOBIN QUAN: CPT

## 2024-09-03 PROCEDURE — 0202U NFCT DS 22 TRGT SARS-COV-2: CPT | Performed by: NURSE PRACTITIONER

## 2024-09-03 PROCEDURE — 25010000002 CEFEPIME PER 500 MG: Performed by: NURSE PRACTITIONER

## 2024-09-03 PROCEDURE — 25010000002 VANCOMYCIN 10 G RECONSTITUTED SOLUTION

## 2024-09-03 PROCEDURE — 36600 WITHDRAWAL OF ARTERIAL BLOOD: CPT

## 2024-09-03 RX ORDER — SODIUM CHLORIDE 0.9 % (FLUSH) 0.9 %
10 SYRINGE (ML) INJECTION EVERY 12 HOURS SCHEDULED
Status: DISCONTINUED | OUTPATIENT
Start: 2024-09-03 | End: 2024-09-03 | Stop reason: HOSPADM

## 2024-09-03 RX ORDER — MIDAZOLAM HYDROCHLORIDE 1 MG/ML
2 INJECTION INTRAMUSCULAR; INTRAVENOUS
Status: DISCONTINUED | OUTPATIENT
Start: 2024-09-03 | End: 2024-09-03 | Stop reason: HOSPADM

## 2024-09-03 RX ORDER — LABETALOL HYDROCHLORIDE 5 MG/ML
10 INJECTION, SOLUTION INTRAVENOUS
Status: DISCONTINUED | OUTPATIENT
Start: 2024-09-03 | End: 2024-09-03 | Stop reason: HOSPADM

## 2024-09-03 RX ORDER — CLONIDINE HYDROCHLORIDE 0.1 MG/1
0.1 TABLET ORAL EVERY 6 HOURS SCHEDULED
Status: DISCONTINUED | OUTPATIENT
Start: 2024-09-03 | End: 2024-09-03

## 2024-09-03 RX ORDER — POTASSIUM CHLORIDE 7.45 MG/ML
10 INJECTION INTRAVENOUS
Status: COMPLETED | OUTPATIENT
Start: 2024-09-03 | End: 2024-09-03

## 2024-09-03 RX ORDER — MIDAZOLAM HYDROCHLORIDE 1 MG/ML
4 INJECTION INTRAMUSCULAR; INTRAVENOUS
Status: DISCONTINUED | OUTPATIENT
Start: 2024-09-03 | End: 2024-09-03 | Stop reason: HOSPADM

## 2024-09-03 RX ORDER — SODIUM CHLORIDE 0.9 % (FLUSH) 0.9 %
20 SYRINGE (ML) INJECTION AS NEEDED
Status: DISCONTINUED | OUTPATIENT
Start: 2024-09-03 | End: 2024-09-03 | Stop reason: HOSPADM

## 2024-09-03 RX ORDER — ALBUTEROL SULFATE 0.83 MG/ML
2.5 SOLUTION RESPIRATORY (INHALATION) ONCE AS NEEDED
Status: DISCONTINUED | OUTPATIENT
Start: 2024-09-03 | End: 2024-09-03 | Stop reason: HOSPADM

## 2024-09-03 RX ORDER — INSULIN LISPRO 100 [IU]/ML
2-7 INJECTION, SOLUTION INTRAVENOUS; SUBCUTANEOUS
Status: DISCONTINUED | OUTPATIENT
Start: 2024-09-03 | End: 2024-09-03 | Stop reason: HOSPADM

## 2024-09-03 RX ORDER — ENOXAPARIN SODIUM 100 MG/ML
40 INJECTION SUBCUTANEOUS EVERY 12 HOURS
Status: DISCONTINUED | OUTPATIENT
Start: 2024-09-03 | End: 2024-09-03 | Stop reason: HOSPADM

## 2024-09-03 RX ORDER — SODIUM CHLORIDE 0.9 % (FLUSH) 0.9 %
10 SYRINGE (ML) INJECTION AS NEEDED
Status: DISCONTINUED | OUTPATIENT
Start: 2024-09-03 | End: 2024-09-03 | Stop reason: HOSPADM

## 2024-09-03 RX ORDER — NALOXONE HYDROCHLORIDE 0.4 MG/ML
INJECTION, SOLUTION INTRAMUSCULAR; INTRAVENOUS; SUBCUTANEOUS
Status: COMPLETED
Start: 2024-09-03 | End: 2024-09-03

## 2024-09-03 RX ORDER — MAGNESIUM SULFATE HEPTAHYDRATE 40 MG/ML
2 INJECTION, SOLUTION INTRAVENOUS
Status: COMPLETED | OUTPATIENT
Start: 2024-09-03 | End: 2024-09-03

## 2024-09-03 RX ORDER — LABETALOL HYDROCHLORIDE 5 MG/ML
20 INJECTION, SOLUTION INTRAVENOUS ONCE
Status: CANCELLED | OUTPATIENT
Start: 2024-09-03 | End: 2024-09-03

## 2024-09-03 RX ORDER — LORAZEPAM 1 MG/1
1 TABLET ORAL
Status: DISCONTINUED | OUTPATIENT
Start: 2024-09-03 | End: 2024-09-03 | Stop reason: HOSPADM

## 2024-09-03 RX ORDER — LORAZEPAM 1 MG/1
2 TABLET ORAL
Status: DISCONTINUED | OUTPATIENT
Start: 2024-09-03 | End: 2024-09-03 | Stop reason: HOSPADM

## 2024-09-03 RX ORDER — BUMETANIDE 0.25 MG/ML
2 INJECTION INTRAMUSCULAR; INTRAVENOUS ONCE
Status: COMPLETED | OUTPATIENT
Start: 2024-09-03 | End: 2024-09-03

## 2024-09-03 RX ADMIN — INSULIN HUMAN 2 UNITS: 100 INJECTION, SOLUTION PARENTERAL at 11:52

## 2024-09-03 RX ADMIN — INSULIN HUMAN 2 UNITS: 100 INJECTION, SOLUTION PARENTERAL at 03:07

## 2024-09-03 RX ADMIN — THIAMINE HYDROCHLORIDE 500 MG: 100 INJECTION, SOLUTION INTRAMUSCULAR; INTRAVENOUS at 11:51

## 2024-09-03 RX ADMIN — MAGNESIUM SULFATE HEPTAHYDRATE 2 G: 40 INJECTION, SOLUTION INTRAVENOUS at 09:56

## 2024-09-03 RX ADMIN — NALXONE HYDROCHLORIDE: 0.4 INJECTION INTRAMUSCULAR; INTRAVENOUS; SUBCUTANEOUS at 00:31

## 2024-09-03 RX ADMIN — Medication 10 ML: at 09:56

## 2024-09-03 RX ADMIN — POTASSIUM PHOSPHATE, MONOBASIC POTASSIUM PHOSPHATE, DIBASIC 15 MMOL: 224; 236 INJECTION, SOLUTION, CONCENTRATE INTRAVENOUS at 04:59

## 2024-09-03 RX ADMIN — INSULIN HUMAN 2 UNITS: 100 INJECTION, SOLUTION PARENTERAL at 06:28

## 2024-09-03 RX ADMIN — BUMETANIDE 2 MG: 0.25 INJECTION INTRAMUSCULAR; INTRAVENOUS at 02:52

## 2024-09-03 RX ADMIN — ENOXAPARIN SODIUM 40 MG: 100 INJECTION SUBCUTANEOUS at 08:23

## 2024-09-03 RX ADMIN — MAGNESIUM SULFATE HEPTAHYDRATE 2 G: 40 INJECTION, SOLUTION INTRAVENOUS at 08:22

## 2024-09-03 RX ADMIN — Medication 10 MG: at 05:58

## 2024-09-03 RX ADMIN — VANCOMYCIN HYDROCHLORIDE 2250 MG: 10 INJECTION, POWDER, LYOPHILIZED, FOR SOLUTION INTRAVENOUS at 04:56

## 2024-09-03 RX ADMIN — POTASSIUM CHLORIDE 10 MEQ: 7.46 INJECTION, SOLUTION INTRAVENOUS at 09:56

## 2024-09-03 RX ADMIN — THIAMINE HYDROCHLORIDE 500 MG: 100 INJECTION, SOLUTION INTRAMUSCULAR; INTRAVENOUS at 04:56

## 2024-09-03 RX ADMIN — PANTOPRAZOLE SODIUM 40 MG: 40 INJECTION, POWDER, FOR SOLUTION INTRAVENOUS at 05:51

## 2024-09-03 RX ADMIN — AZITHROMYCIN DIHYDRATE 500 MG: 500 INJECTION, POWDER, LYOPHILIZED, FOR SOLUTION INTRAVENOUS at 03:35

## 2024-09-03 RX ADMIN — Medication 10 ML: at 02:55

## 2024-09-03 RX ADMIN — POTASSIUM CHLORIDE 10 MEQ: 7.46 INJECTION, SOLUTION INTRAVENOUS at 08:23

## 2024-09-03 RX ADMIN — CEFEPIME 2000 MG: 2 INJECTION, POWDER, FOR SOLUTION INTRAVENOUS at 03:12

## 2024-09-03 RX ADMIN — MAGNESIUM SULFATE HEPTAHYDRATE 2 G: 40 INJECTION, SOLUTION INTRAVENOUS at 05:51

## 2024-09-03 RX ADMIN — POTASSIUM CHLORIDE 10 MEQ: 7.46 INJECTION, SOLUTION INTRAVENOUS at 05:51

## 2024-09-03 RX ADMIN — POTASSIUM CHLORIDE 10 MEQ: 7.46 INJECTION, SOLUTION INTRAVENOUS at 07:10

## 2024-09-03 RX ADMIN — Medication 10 ML: at 08:23

## 2024-09-03 NOTE — NURSING NOTE
Patient requested to leave AMA. Nurse practitioner @ bedside to explain risks.     Security also present when belongings returned. Patient stated everything thing was present that she had when she arrived at our facility. (Phone, money, cigarettes, clothing, and shoes.)     PICC line removed before patient left.     Patient signed AMA paperwork and left ambulatory with all belongings.

## 2024-09-03 NOTE — DISCHARGE SUMMARY
Admit date: 9/2/2024  Date of Discharge:  9/3/2024    Discharge Diagnosis:   Problems Addressed this Visit    None  Visit Diagnoses       Resistant hypertension    -  Primary    Relevant Medications    cloNIDine (CATAPRES) tablet 0.1 mg (Completed)    cloNIDine (CATAPRES) tablet 0.2 mg (Completed)    hydrALAZINE (APRESOLINE) injection 20 mg (Completed)    bumetanide (BUMEX) injection 2 mg (Completed)    labetalol (NORMODYNE,TRANDATE) injection 10 mg    Opioid withdrawal              Diagnoses         Codes Comments    Resistant hypertension    -  Primary ICD-10-CM: I1A.0  ICD-9-CM: 401.9     Opioid withdrawal     ICD-10-CM: F11.93  ICD-9-CM: 292.0, 305.50           Hospital Course:  Ms. Arredondo is a 49 y.o. female, who presented to this Hospital on 9/2/2024 because of opioid withdrawal.  She has a past medical history of DM2, RONQUILLO, fibromyalgia, hep C and Heard's esophagus.  The patient presented to our ED after going to the [facility] for assessment of opiate rehab.  During her evaluation she was noted to be hypertensive with a systolic greater than 220 and experiencing withdrawal symptoms. She was directed to seek ED evaluation for which she presented to [facility]. She was subsequently admitted to [facility] Hospitalist service. Shortly thereafter she became hypotensive, tachypneic and obtunded. Patient was therefore transferred to the ICU. The Cardene drip was stopped and she was given a 1 L NS bolus with improved blood pressure. She was placed on BiPAP for tachypnea with improvement. Patient's mentation improved and she was able to come off BiPAP overnight. She was originally placed on Abx for possible PNA. CT chest ruled out pulmonary infection so antibiotics were discontinued. CT head revealed no acute process. On 9//3/24, patient requested to leave AMA. She was felt to be of sound mind and understands the risks with leaving AMA. She will arrange for an Uber home.       Chief Complaint   Patient presents  with    Abdominal Pain     Pertinent Test Results:   Results from last 7 days   Lab Units 09/03/24  0108   WBC 10*3/mm3 20.47*   HEMOGLOBIN g/dL 16.3*   HEMATOCRIT % 46.4   PLATELETS 10*3/mm3 195     Results from last 7 days   Lab Units 09/03/24  0128 09/02/24  1527   SODIUM mmol/L 134*  134* 137   POTASSIUM mmol/L 3.3*  3.5 4.0   CHLORIDE mmol/L 98  98 97*   CO2 mmol/L 18.0*  20.0* 23.2   BUN mg/dL 8  7 8   CREATININE mg/dL 0.57  0.64 0.61   EGFR mL/min/1.73 111.6  108.5 109.8   GLUCOSE mg/dL 181*  179* 181*   CALCIUM mg/dL 8.7  8.6 9.7   PHOSPHORUS mg/dL 2.2*  --      Condition on Discharge:  stable.    Vital Signs  Temp:  [97.6 °F (36.4 °C)-98.7 °F (37.1 °C)] 97.6 °F (36.4 °C)  Heart Rate:  [] 97  Resp:  [16-38] 20  BP: ()/() 141/105  Physical Examination copied from note 9/2/24.  Telemetry:  Rhythm: sinus tachycardia (09/02/24 2315)   Constitutional:  No acute distress.   Cardiovascular: RRR.    Respiratory: Normal breath sounds  No adventitious sounds   Abdominal:  Soft with no tenderness.   Extremities: No Edema   Neurological:             Awake.  Best Eye Response: 3-->(E3) to speech (09/02/24 2315)  Best Motor Response: 5-->(M5) localizes pain (09/02/24 2315)  Best Verbal Response: 4-->(V4) confused (09/02/24 2315)  Sellers Coma Scale Score: 12 (09/02/24 2315)     Discharge Disposition: Patient left AMA.      Discharge Medications:     Discharge Medications        Continue These Medications        Instructions Start Date   amLODIPine 5 MG tablet  Commonly known as: NORVASC   TK 1 T PO QD      carisoprodol 350 MG tablet  Commonly known as: SOMA   TK 1 T PO QID FOR 10 DAYS PRN P      cetirizine 10 MG tablet  Commonly known as: zyrTEC   TAKE 1 TABLET BY MOUTH EVERY DAY FOR ALLERGIES      diclofenac 50 MG EC tablet  Commonly known as: VOLTAREN   TK 1 T PO TID FOR 10 DAYS      furosemide 40 MG tablet  Commonly known as: LASIX   40 mg, Oral, As Needed      lisinopril 40 MG  tablet  Commonly known as: PRINIVIL,ZESTRIL   1 tablet, Oral, Daily      metFORMIN 1000 MG tablet  Commonly known as: GLUCOPHAGE   TK 1 T PO BID      potassium chloride 10 MEQ CR tablet   2 tablets, Oral, Daily      potassium chloride 20 MEQ packet  Commonly known as: KLOR-CON   20 mEq, Oral, As Needed      pravastatin 10 MG tablet  Commonly known as: PRAVACHOL   10 mg, Oral, Nightly      Premarin 0.625 MG tablet  Generic drug: estrogens (conjugated)   1 tablet, Oral, Daily      ribavirin 200 MG capsule  Commonly known as: REBETOL   200 mg, Oral, 2 Times Daily      vitamin D3 125 MCG (5000 UT) capsule capsule   5,000 Units, Oral, Weekly               Discharge Diet:     Activity at Discharge:     Follow-up Appointments  No future appointments.      Test Results Pending at Discharge  Pending Labs       Order Current Status    Blood Culture - Blood, Arm, Right In process    Blood Culture - Blood, Hand, Left In process             Isi Haywood, DAMEON  09/03/24  13:54 EDT    Time: Discharge 10 min

## 2024-09-03 NOTE — PROGRESS NOTES
Critical care follow-up:    Imaging including CT of the chest have been reviewed.  No evidence of current pulmonary infection.  Plan to stop antimicrobials for now.  Patient is currently calm.  Will watch for any further sign of withdrawal.  Start diet now.  Transition to telemetry.

## 2024-09-03 NOTE — PAYOR COMM NOTE
"ID: [id number]   : 1975    Withdrawal from opioids [F11.93]   Sagrario Zaman MD (NPI: [id number])     KOLBY Waterman, RN  Utilization Review  Phone [phone]  Fax [phone]    [facility]  [address] [place] [address]           Vashti Arredondo (49 y.o. Female)       Date of Birth   1975    Social Security Number       Address   [facility] [address] [place] [address]    Home Phone   [phone]    MRN   [id number]       Orthodox   Jewish    Marital Status                               Admission Date   24    Admission Type   Emergency    Admitting Provider   Sagrario Zaman MD    Attending Provider   Sagrario Zaman MD    Department, Room/Bed   [facility] 2B ICU, N234/1       Discharge Date       Discharge Disposition       Discharge Destination                                 Attending Provider: Sagrario Zaman MD    Allergies: Lyrica [Pregabalin], Reglan [Metoclopramide]    Isolation: None   Infection: None   Code Status: CPR    Ht: 167.6 cm (66\")   Wt: 113 kg (250 lb)    Admission Cmt: None   Principal Problem: Withdrawal from opioids [F11.93]                   Active Insurance as of 2024       Primary Coverage       Payor Plan Insurance Group Employer/Plan Group    [employer] [place]ICA[place]        Payor Plan Address Payor Plan Phone Number Payor Plan Fax Number Effective Dates    [address] [address] [phone]  2016 - None Entered    [facility] [address]         Subscriber Name Subscriber Birth Date Member ID       VASHTI ARREDONDO 1975 [id number]                     Emergency Contacts        (Rel.) Home Phone Work Phone Mobile Phone    Haroon Nesbitt (Son) -- -- [phone]    Lucas Arredondo (Daughter) -- -- [phone]              [place]: NPI [id number] Tax ID [id number]  Insurance Information                  [facility] MEDICAID Phone: [phone]    Subscriber: " Vashti Arredondo Subscriber#: [id number]    Group#: -- Precert#: --             History & Physical        Long Greenberg MD at 24 2140              [facility]  HISTORY AND PHYSICAL    Patient Name: Vashti Arredondo  : 1975  MRN: [id number]  Primary Care Physician: Jazmine Faustin APRN  Date of admission: 2024      Subjective  Subjective     Chief Complaint:  Withdrawal symptoms    HPI:  Vashti Arredondo is a 49 y.o. female with history of hepatitis C, GERD, type 2 diabetes, opiate use disorder.  She last used heroin yesterday and went to the [facility] for assessment for opiate rehabilitation.  During her evaluation she was noted to be hypertensive with systolic greater than 220 and she was experiencing withdrawal symptoms so she was sent to the ED for evaluation.  She went to [facility] and tested positive for benzodiazepines and fentanyl in her urine.  She is quite hypertensive and complaining of diffuse myalgias, nausea, diarrhea, and vomiting, abdominal pain, headache.  She is given several doses of antihypertensives and ultimately started on Cardene drip, as well as a dose of Versed for potential withdrawal.  On arrival here she is still on Cardene with a systolic pressure in the 90s and she is extremely somnolent and difficult to arouse.  She is able to follow simple commands only briefly.      Personal History     Past Medical History:   Diagnosis Date    Heard esophagus     Chronic pain     Diabetes mellitus     Fibromyalgia     Fracture of coccyx     Heartburn     Hepatitis C     History of sinus bradycardia     RONQUILLO (nonalcoholic steatohepatitis)     Obesity     Osteoporosis     Reflex sympathetic dystrophy     Rheumatoid factor positive            Past Surgical History:   Procedure Laterality Date    BACK SURGERY      CARDIAC CATHETERIZATION      CHOLECYSTECTOMY      DILATATION AND CURETTAGE      FOOT SURGERY      HYSTERECTOMY      INTERVENTIONAL  "RADIOLOGY PROCEDURE N/A 2/19/2018    Procedure: myelogram lumbar spine;  Surgeon: Octaviano Breen MD;  Location: [facility] INVASIVE LOCATION;  Service:     LIVER BIOPSY      UPPER GASTROINTESTINAL ENDOSCOPY      diagnostic       Family History: family history includes Asthma in her mother; COPD in her mother; Heart disease in her father.     Social History:  reports that she has been smoking cigarettes. She has never used smokeless tobacco. She reports that she does not drink alcohol and does not use drugs.  Social History     Social History Narrative    Not on file       Medications:  Available home medication information reviewed.  amLODIPine, carisoprodol, cetirizine, ciprofloxacin, diclofenac, doxycycline, estrogens (conjugated), furosemide, lisinopril, metFORMIN, omega-3 acid ethyl esters, omeprazole, potassium chloride, pravastatin, ribavirin, vitamin D, and vitamin D3    Allergies   Allergen Reactions    Lyrica [Pregabalin] Other (See Comments) and Mental Status Change     Blisters in mouth    Reglan [Metoclopramide] Other (See Comments)     IV administration - pt states she \"felt really weird\"       Objective  Objective     Vital Signs:   Temp:  [98.1 °F (36.7 °C)-98.7 °F (37.1 °C)] 98.7 °F (37.1 °C)  Heart Rate:  [] 105  Resp:  [16-38] 26  BP: ()/() 104/68  Flow (L/min):  [3] 3       Physical Exam   Extremely somnolent, able to open eyes briefly with verbal prompting and looks at examiner, falls back asleep immediately  Pupils are equal and reactive to light  Neurological exam limited by mental status  Nonicteric, slightly diaphoretic  Heart rate irregular, appears to be having brief runs of SVT.  Lungs clear to auscultation bilaterally  She is snoring  Abdomen nontender nondistended  There is no peripheral edema  Extremities appear warm and well-perfused    Result Review:  I have personally reviewed the results from the time of this admission to 9/3/2024 00:03 EDT and agree with these " findings:  [x]  Laboratory list / accordion  []  Microbiology  [x]  Radiology  [x]  EKG/Telemetry   []  Cardiology/Vascular   []  Pathology  []  Old records  []  Other:  Most notable findings include: See A+P      LAB RESULTS:      Lab 09/02/24  1527   WBC 13.02*   HEMOGLOBIN 16.8*   HEMATOCRIT 49.1*   PLATELETS 233   NEUTROS ABS 10.68*   IMMATURE GRANS (ABS) 0.02   LYMPHS ABS 1.81   MONOS ABS 0.49   EOS ABS 0.01   MCV 84.8         Lab 09/02/24  1527   SODIUM 137   POTASSIUM 4.0   CHLORIDE 97*   CO2 23.2   ANION GAP 16.8*   BUN 8   CREATININE 0.61   EGFR 109.8   GLUCOSE 181*   CALCIUM 9.7         Lab 09/02/24  1527   TOTAL PROTEIN 8.1   ALBUMIN 4.6   GLOBULIN 3.5   ALT (SGPT) 7   AST (SGOT) 28   BILIRUBIN 0.9   ALK PHOS 85   LIPASE 20                     UA          9/2/2024    17:59   Urinalysis   Squamous Epithelial Cells, UA 3-6    Specific Gravity, UA 1.025    Ketones, UA 40 mg/dL (2+)    Blood, UA Trace    Leukocytes, UA Negative    Nitrite, UA Negative    RBC, UA 3-5    WBC, UA 0-2    Bacteria, UA Trace        Microbiology Results (last 10 days)       ** No results found for the last 240 hours. **            CT Abdomen Pelvis With Contrast    Result Date: 9/2/2024  CT ABDOMEN PELVIS W CONTRAST Date of Exam: 9/2/2024 7:26 PM EDT Indication: Abdominal pain. Comparison: 6/20/2018 Technique: Axial CT images were obtained of the abdomen and pelvis following the uneventful intravenous administration of 100 cc Isovue-300 . Reconstructed coronal and sagittal images were also obtained. Automated exposure control and iterative construction methods were used. Findings: Visualized lung bases are clear. Liver has lobular margins suggesting changes of cirrhosis. No focal hepatic mass identified. Patient is post cholecystectomy. Pancreas and spleen are within normal limits. Bilateral adrenal glands are within normal limits. There is cortical scarring of the right kidney. Kidneys are otherwise unremarkable. No hydronephrosis.  There is no abdominal or retroperitoneal adenopathy. The upper GI tract is within normal limits. Pelvis: Urinary bladder is within normal limits. Patient is status post hysterectomy. GI tract including the appendix is normal. There is no pelvic or inguinal adenopathy. No free intraperitoneal fluid. There are no lytic or sclerotic bony lesions identified.     Impression: Impression: 1. Lobular liver margins compatible changes of cirrhosis. 2. No acute process seen within the abdomen or pelvis. Electronically Signed: Dylan Almendarez MD  9/2/2024 7:47 PM EDT  Workstation ID: [id number]     Results for orders placed during the hospital encounter of 07/11/16    Adult transthoracic echo complete    Interpretation Summary  · All left ventricular wall segments contract normally.  · Left ventricular wall thickness is consistent with mild concentric hypertrophy.  · Left atrial cavity size is mildly dilated.  · Estimated EF = 65%.  · Left ventricular diastolic dysfunction (grade I) consistent with impaired relaxation.  · Right ventricular cavity is borderline dilated.      Assessment & Plan  Assessment & Plan       Withdrawal from opioids    Acute encephalopathy  -Suspect secondary to acute drop in blood pressure with antihypertensives.  This appears to be an acute change from the report I received earlier as her mental status was normal presentation  -Will check blood gas, start empiric BiPAP given brief apneic periods and snoring.    -Give fluid bolus now to bring pressure back up.    -If no response to the above will consider Narcan  -She did complain of a headache on presentation per chart and was hypertensive, will also send for stat CT head    Acute opiate withdrawal and suspected benzodiazepine withdrawal  -Utox + fentanyl and benzodiazepines. Had voluntarily checked herself in to the [facility] and willing to seek treatment. Endorsed heroin use on admission but not etoh or benzo use  -Start CIWA monitoring with PRN available  given +utox, add scheduled if requiring PRN  -Will add adjunctive meds as needed  -CM    Marked hypertension  -SBP >200 on presentation and resistant to antihypertensives. Suspect primarily driven by withdrawal. She received cardene and several acute antihypertensives and is now hypotensive  -TSH, Urine Hcg  -Discontinue cardene  -Give fluid bolus now    History of Hep C  Hepatic cirrhosis  -Prior steatosis but now appears to have progressed to cirrhosis. Looks like she was treated in 2016. Will check HIV status and hep A and B immunity status. Will need follow up on discharge. Hep A and B vaccination prior to discharge if needed    T2DM  -on GLP and orals at home. SSI here    GERD  -on PPI at home, dose IV while NPO        VTE Prophylaxis:  Mechanical VTE prophylaxis orders are present.          CODE STATUS:    Code Status and Medical Interventions: CPR (Attempt to Resuscitate); Full Support   Ordered at: 09/02/24 2348     Code Status (Patient has no pulse and is not breathing):    CPR (Attempt to Resuscitate)     Medical Interventions (Patient has pulse or is breathing):    Full Support       Expected Discharge   Expected discharge date/ time has not been documented.     Long Greenberg MD  09/03/24      Electronically signed by Long Greenberg MD at 09/03/24 0005          Emergency Department Notes        Ilya Mcintosh II RN at 09/02/24 2156          Report called to ARNAV Bennett    Electronically signed by Ilya Mcintosh II, RN at 09/02/24 2157       Ilya Mcintosh II, RN at 09/02/24 2142           Vashti Arredondo    Nursing Report ED to Floor:  Mental status: Alert X 4  Ambulatory status: With assistance X 1  Oxygen Therapy:  Room air  Cardiac Rhythm: Normal sinus   Admitted from: Home  Safety Concerns:  Ambulatory with assistance  Social Issues: None  ED Room #:  14    ED Nurse Phone Extension - [phone] or may call [phone].      HPI:   Chief Complaint   Patient presents with    Abdominal Pain       Past  Medical History:  Past Medical History:   Diagnosis Date    Heard esophagus     Chronic pain     Diabetes mellitus     Fibromyalgia     Fracture of coccyx     Heartburn     Hepatitis C     History of sinus bradycardia     RONQUILLO (nonalcoholic steatohepatitis)     Obesity     Osteoporosis     Reflex sympathetic dystrophy     Rheumatoid factor positive         Past Surgical History:  Past Surgical History:   Procedure Laterality Date    BACK SURGERY      CARDIAC CATHETERIZATION      CHOLECYSTECTOMY      DILATATION AND CURETTAGE      FOOT SURGERY      HYSTERECTOMY      INTERVENTIONAL RADIOLOGY PROCEDURE N/A 2/19/2018    Procedure: myelogram lumbar spine;  Surgeon: Octaviano Breen MD;  Location: [facility] INVASIVE LOCATION;  Service:     LIVER BIOPSY      UPPER GASTROINTESTINAL ENDOSCOPY      diagnostic        Admitting Doctor:   No admitting provider for patient encounter.    Consulting Provider(s):  Consults       No orders found from 8/4/2024 to 9/3/2024.             Admitting Diagnosis:   The primary encounter diagnosis was Resistant hypertension. A diagnosis of Opioid withdrawal was also pertinent to this visit.    Most Recent Vitals:   Vitals:    09/02/24 2041 09/02/24 2045 09/02/24 2100 09/02/24 2129   BP: (!) 199/111 (!) 198/111 177/89 166/87   BP Location:   Right arm Right arm   Patient Position:  Lying Lying Lying   Pulse: 85 100 114 (!) 125   Resp:  24     Temp:       TempSrc:       SpO2:  99% 98% 97%   Weight:       Height:           Active LDAs/IV Access:   Lines, Drains & Airways       Active LDAs       Name Placement date Placement time Site Days    Peripheral IV 09/02/24 1521 Right Antecubital 09/02/24  1521  Antecubital  less than 1                    Labs (abnormal labs have a star):   Labs Reviewed   COMPREHENSIVE METABOLIC PANEL - Abnormal; Notable for the following components:       Result Value    Glucose 181 (*)     Chloride 97 (*)     Anion Gap 16.8 (*)     All other components within normal  limits    Narrative:     GFR Normal >60  Chronic Kidney Disease <60  Kidney Failure <15     URINALYSIS W/ MICROSCOPIC IF INDICATED (NO CULTURE) - Abnormal; Notable for the following components:    Glucose,  mg/dL (Trace) (*)     Ketones, UA 40 mg/dL (2+) (*)     Blood, UA Trace (*)     Protein, UA Trace (*)     All other components within normal limits   CBC WITH AUTO DIFFERENTIAL - Abnormal; Notable for the following components:    WBC 13.02 (*)     RBC 5.79 (*)     Hemoglobin 16.8 (*)     Hematocrit 49.1 (*)     Neutrophil % 81.9 (*)     Lymphocyte % 13.9 (*)     Monocyte % 3.8 (*)     Eosinophil % 0.1 (*)     Neutrophils, Absolute 10.68 (*)     All other components within normal limits   URINE DRUG SCREEN - Abnormal; Notable for the following components:    Benzodiazepine Screen, Urine Positive (*)     All other components within normal limits    Narrative:     Cutoff For Drugs Screened:    Amphetamines               500 ng/ml  Barbiturates               200 ng/ml  Benzodiazepines            150 ng/ml  Cocaine                    150 ng/ml  Methadone                  200 ng/ml  Opiates                    100 ng/ml  Phencyclidine               25 ng/ml  THC                         50 ng/ml  Methamphetamine            500 ng/ml  Tricyclic Antidepressants  300 ng/ml  Oxycodone                  100 ng/ml  Buprenorphine               10 ng/ml    The normal value for all drugs tested is negative. This report includes unconfirmed screening results, with the cutoff values listed, to be used for medical treatment purposes only.  Unconfirmed results must not be used for non-medical purposes such as employment or legal testing.  Clinical consideration should be applied to any drug of abuse test, particularly when unconfirmed results are used.     FENTANYL, URINE - Abnormal; Notable for the following components:    Fentanyl, Urine Positive (*)     All other components within normal limits    Narrative:     Negative  Threshold:      Fentanyl 5 ng/mL     The normal value for the drug tested is negative. This report includes final unconfirmed screening results to be used for medical treatment purposes only. Unconfirmed results must not be used for non-medical purposes such as employment or legal testing. Clinical consideration should be applied to any drug of abuse test, particularly when unconfirmed results are used.          URINALYSIS, MICROSCOPIC ONLY - Abnormal; Notable for the following components:    RBC, UA 3-5 (*)     Bacteria, UA Trace (*)     Squamous Epithelial Cells, UA 3-6 (*)     Mucus, UA Small/1+ (*)     All other components within normal limits   LIPASE - Normal   RAINBOW DRAW    Narrative:     The following orders were created for panel order [place] Draw.  Procedure                               Abnormality         Status                     ---------                               -----------         ------                     Green Top (Gel)[[id number]]                                  Final result               Lavender Top[[id number]]                                     Final result               Gold Top - SST[[id number]]                                   Final result               [place] Top[[id number]]                                         Final result               Light Blue Top[[id number]]                                   Final result                 Please view results for these tests on the individual orders.   CBC AND DIFFERENTIAL    Narrative:     The following orders were created for panel order CBC & Differential.  Procedure                               Abnormality         Status                     ---------                               -----------         ------                     CBC Auto Differential[[id number]]        Abnormal            Final result                 Please view results for these tests on the individual orders.   GREEN TOP   LAVENDER TOP   GOLD TOP - SST   GRAY TOP   LIGHT BLUE  TOP       Meds Given in ED:   Medications   Sodium Chloride (PF) 0.9 % 10 mL (has no administration in time range)   hydrALAZINE (APRESOLINE) injection 20 mg (has no administration in time range)   niCARdipine (CARDENE) 25mg in 250mL NS infusion (5 mg/hr Intravenous New Bag 9/2/24 2041)   sodium chloride 0.9 % bolus 1,000 mL (0 mL Intravenous Stopped 9/2/24 1719)   cloNIDine (CATAPRES) tablet 0.1 mg (0.1 mg Oral Given 9/2/24 1552)   droperidol (INAPSINE) injection 2.5 mg (2.5 mg Intravenous Given 9/2/24 1630)   sodium chloride 0.9 % bolus 1,000 mL (0 mL Intravenous Stopped 9/2/24 1848)   ketorolac (TORADOL) injection 15 mg (15 mg Intravenous Given 9/2/24 1800)   cloNIDine (CATAPRES) tablet 0.2 mg (0.2 mg Oral Given 9/2/24 1800)   hydrALAZINE (APRESOLINE) injection 20 mg (20 mg Intravenous Given 9/2/24 2008)   iopamidol (ISOVUE-300) 61 % injection 100 mL (100 mL Intravenous Given 9/2/24 1934)   ketorolac (TORADOL) injection 15 mg (15 mg Intravenous Given 9/2/24 2057)   midazolam (VERSED) injection 2 mg (2 mg Intravenous Given 9/2/24 2130)     niCARdipine, 5-15 mg/hr, Last Rate: 5 mg/hr (09/02/24 2041)         Last NIH score:                                                          Dysphagia screening results:  Patient Factors Component (Dysphagia:Stroke or Rule-out)  Best Eye Response: 4-->(E4) spontaneous (09/02/24 2015)  Best Motor Response: 6-->(M6) obeys commands (09/02/24 2015)  Best Verbal Response: 5-->(V5) oriented (09/02/24 2015)  [place] Coma Scale Score: 15 (09/02/24 2015)     [place] Coma Scale:  No data recorded     CIWA:        Restraint Type:            Isolation Status:  No active isolations          Electronically signed by Ilya Mcintosh II, RN at 09/02/24 8495       Magnus Hanson MD at 09/02/24 9820          Subjective   History of Present Illness  Patient presents to the emergency department for abdominal pain and abnormal vital signs.  She states that she was going to the [facility] to receive  "detox for heroin and they noticed that her vitals were abnormal so sent her to the hospital for evaluation.  Says she last snorted heroin yesterday.  Complains of abdominal pain and nausea no diarrhea.  Has withdrawn before.  She denies any blood in her vomit or stool.    History provided by:  Patient   used: No        Review of Systems   Gastrointestinal:  Positive for abdominal pain.   All other systems reviewed and are negative.      Past Medical History:   Diagnosis Date    Heard esophagus     Chronic pain     Diabetes mellitus     Fibromyalgia     Fracture of coccyx     Heartburn     Hepatitis C     History of sinus bradycardia     RONQUILLO (nonalcoholic steatohepatitis)     Obesity     Osteoporosis     Reflex sympathetic dystrophy     Rheumatoid factor positive        Allergies   Allergen Reactions    Lyrica [Pregabalin] Other (See Comments) and Mental Status Change     Blisters in mouth    Reglan [Metoclopramide] Other (See Comments)     IV administration - pt states she \"felt really weird\"       Past Surgical History:   Procedure Laterality Date    BACK SURGERY      CARDIAC CATHETERIZATION      CHOLECYSTECTOMY      DILATATION AND CURETTAGE      FOOT SURGERY      HYSTERECTOMY      INTERVENTIONAL RADIOLOGY PROCEDURE N/A 2/19/2018    Procedure: myelogram lumbar spine;  Surgeon: Octaviano Breen MD;  Location: [facility] INVASIVE LOCATION;  Service:     LIVER BIOPSY      UPPER GASTROINTESTINAL ENDOSCOPY      diagnostic       Family History   Problem Relation Age of Onset    COPD Mother     Asthma Mother     Heart disease Father        Social History     Socioeconomic History    Marital status:    Tobacco Use    Smoking status: Every Day     Current packs/day: 1.00     Types: Cigarettes    Smokeless tobacco: Never   Substance and Sexual Activity    Alcohol use: No    Drug use: No    Sexual activity: Defer           Objective   Physical Exam  Vitals and nursing note reviewed. "   Constitutional:       Appearance: She is well-developed. She is obese.   HENT:      Head: Normocephalic and atraumatic.      Mouth/Throat:      Mouth: Mucous membranes are moist.      Pharynx: Oropharynx is clear.   Eyes:      Extraocular Movements: Extraocular movements intact.      Pupils: Pupils are equal, round, and reactive to light.   Cardiovascular:      Rate and Rhythm: Normal rate and regular rhythm.      Heart sounds: Normal heart sounds.   Pulmonary:      Effort: Pulmonary effort is normal.      Breath sounds: Normal breath sounds.   Abdominal:      Palpations: Abdomen is soft. There is no shifting dullness, fluid wave, hepatomegaly or splenomegaly.      Tenderness: There is no abdominal tenderness. There is no right CVA tenderness, left CVA tenderness, guarding or rebound.   Skin:     General: Skin is warm and dry.      Capillary Refill: Capillary refill takes less than 2 seconds.   Neurological:      General: No focal deficit present.      Mental Status: She is oriented to person, place, and time.      Cranial Nerves: No cranial nerve deficit.   Psychiatric:         Mood and Affect: Mood normal. Mood is anxious.         Behavior: Behavior normal.         Procedures          ED Course                                           Medical Decision Making  I assumed care of this patient at checkout.  The patient was found to be in improved condition.  Her blood pressure did somewhat fluctuate.  Her nausea was controlled with droperidol.  Patient continued to have significantly elevated blood pressure.  She was then given a second dose of clonidine.  Blood pressure remained elevated and was given 20 of hydralazine.    I assumed care again.  She continues to be profoundly hypertensive despite multiple antihypertensive medicines.  Decision to start on nicardipine drip.  I spoke with hospital medicine Dr. Greenberg at [facility] who will accept her in transfer for further evaluation    Problems  Addressed:  Opioid withdrawal: complicated acute illness or injury  Resistant hypertension: complicated acute illness or injury    Amount and/or Complexity of Data Reviewed  Labs: ordered. Decision-making details documented in ED Course.  Radiology: ordered. Decision-making details documented in ED Course.  ECG/medicine tests: ordered. Decision-making details documented in ED Course.    Risk  Prescription drug management.  Decision regarding hospitalization.        Final diagnoses:   Resistant hypertension   Opioid withdrawal       ED Disposition  ED Disposition       ED Disposition   Decision to Admit    Condition   --    Comment   Level of Care: Telemetry [5]   Accepting Provider:: POORNIMA DAVIES [[id number]]                 No follow-up provider specified.       Medication List      No changes were made to your prescriptions during this visit.           Electronically signed by Magnus Hanson MD at 09/02/24 6536       Vital Signs (last 2 days)       Date/Time Temp Temp src Pulse Resp BP Patient Position SpO2    09/03/24 0800 97.6 (36.4) Oral 82 20 146/116 Lying 96    09/03/24 0700 -- -- 81 -- 166/109 -- 97    09/03/24 0645 -- -- 74 -- 152/111 -- 97    09/03/24 0630 -- -- 81 -- 155/110 -- 98    09/03/24 0615 -- -- 68 -- 166/108 -- 89    09/03/24 0600 -- -- 96 -- 165/103 -- 98    09/03/24 0547 -- -- 92 -- 180/114 -- 97    09/03/24 0545 -- -- 103 -- 197/118 -- 98    09/03/24 0530 -- -- 109 -- 165/108 -- 96    09/03/24 0515 -- -- 98 -- 160/118 -- 99    09/03/24 0500 -- -- 101 -- 164/118 -- 100    09/03/24 0445 -- -- 113 -- 139/113 -- 99    09/03/24 0430 -- -- 112 -- 142/98 -- 100    09/03/24 0415 -- -- 103 -- 147/103 -- 99    09/03/24 0400 98 (36.7) Oral 112 20 132/118 Lying 100    09/03/24 0345 -- -- 88 -- 150/117 -- 100    09/03/24 0300 -- -- 86 -- 167/108 -- 100    09/03/24 0241 -- -- 98 -- -- -- 99    09/03/24 0230 -- -- 98 -- 187/120 -- 98    09/03/24 0215 98.3 (36.8) Oral 124 22 171/119 Lying 99    09/03/24 0030  -- -- 106 26 171/143 -- 100    09/02/24 2315 98.7 (37.1) Axillary 105 26 104/68 Lying 98    09/02/24 2313 -- -- 91 -- 96/73 -- --    09/02/24 2150 -- -- 120 -- 143/96 -- 97    09/02/24 2129 -- -- 125 -- 166/87 Lying 97    09/02/24 2100 -- -- 114 -- 177/89 Lying 98    09/02/24 2045 -- -- 100 24 198/111 Lying 99    09/02/24 2041 -- -- 85 -- 199/111 -- --    09/02/24 2030 -- -- 95 -- 196/106 -- 99    09/02/24 2010 -- -- 74 38 209/108 Lying 98    09/02/24 2008 -- -- 67 -- 197/118 -- --    09/02/24 2000 -- -- 70 -- 201/108 -- 97    09/02/24 1955 -- -- 67 -- 190/109 -- 98    09/02/24 1900 -- -- 72 -- 198/131 -- 99    09/02/24 1802 -- -- 70 -- 204/112 -- 100    09/02/24 1700 -- -- 69 -- 163/144 -- 100    09/02/24 1630 -- -- 62 -- 211/133 -- 100    09/02/24 1600 -- -- 58 -- 214/104 -- 98    09/02/24 1526 -- -- 63 -- 206/124 Lying 100    09/02/24 1524 98.1 (36.7) Oral -- 16 -- -- --          Oxygen Therapy (last 2 days)       Date/Time SpO2 Device (Oxygen Therapy) Flow (L/min) Oxygen Concentration (%) ETCO2 (mmHg)    09/03/24 0800 96 nasal cannula 3 -- --    09/03/24 0700 97 -- -- -- --    09/03/24 0645 97 -- -- -- --    09/03/24 0630 98 -- -- -- --    09/03/24 0615 89 -- -- -- --    09/03/24 0600 98 nasal cannula 3 -- --    09/03/24 0547 97 -- -- -- --    09/03/24 0545 98 -- -- -- --    09/03/24 0530 96 -- -- -- --    09/03/24 0515 99 -- -- -- --    09/03/24 0500 100 -- -- -- --    09/03/24 0445 99 -- -- -- --    09/03/24 0430 100 -- -- -- --    09/03/24 0415 99 -- -- -- --    09/03/24 0400 100 nasal cannula 3 -- --    09/03/24 0345 100 -- -- -- --    09/03/24 0300 100 -- -- -- --    09/03/24 0241 99 nasal cannula 3 -- --    09/03/24 0230 98 -- -- -- --    09/03/24 0215 99 nasal cannula 3 -- --    09/03/24 0030 100 NPPV/NIV -- 32 --    09/03/24 0016 -- -- -- 32 --    09/02/24 2315 98 nasal cannula 3 -- --    09/02/24 2150 97 -- -- -- --    09/02/24 2129 97 -- -- -- --    09/02/24 2100 98 -- -- -- --    09/02/24 2045 99 room  air -- -- --    09/02/24 2030 99 -- -- -- --    09/02/24 2010 98 room air -- -- --    09/02/24 2000 97 -- -- -- --    09/02/24 1955 98 -- -- -- --    09/02/24 1900 99 -- -- -- --    09/02/24 1802 100 -- -- -- --    09/02/24 1700 100 -- -- -- --    09/02/24 1630 100 -- -- -- --    09/02/24 1600 98 -- -- -- --    09/02/24 1526 100 room air -- -- --          Facility-Administered Medications as of 9/3/2024   Medication Dose Route Frequency Provider Last Rate Last Admin    albuterol (PROVENTIL) nebulizer solution 0.083% 2.5 mg/3mL  2.5 mg Nebulization Once PRN Dante Kemp APRN        albuterol (PROVENTIL) nebulizer solution 0.083% 2.5 mg/3mL  2.5 mg Nebulization Once PRN Dante Kemp APRN        azithromycin (ZITHROMAX) 500 mg in sodium chloride 0.9 % 250 mL IVPB-VTB  500 mg Intravenous Q24H Dante Kemp APRN   500 mg at 09/03/24 0335    sennosides-docusate (PERICOLACE) 8.6-50 MG per tablet 2 tablet  2 tablet Oral BID PRN Long Greenberg MD        And    polyethylene glycol (MIRALAX) packet 17 g  17 g Oral Daily PRN Long Greenberg MD        And    bisacodyl (DULCOLAX) EC tablet 5 mg  5 mg Oral Daily PRN Long Greenberg MD        And    bisacodyl (DULCOLAX) suppository 10 mg  10 mg Rectal Daily PRLong Franco MD        [COMPLETED] bumetanide (BUMEX) injection 2 mg  2 mg Intravenous Once Dante Kemp APRN   2 mg at 09/03/24 0252    Calcium Replacement - Follow Nurse / BPA Driven Protocol   Does not apply PRLong Franco MD        [COMPLETED] cefepime 2000 mg IVPB in 100 mL NS (MBP)  2,000 mg Intravenous Once Dante Kemp, APRMARIAN   Stopped at 09/03/24 0641    cefepime 2000 mg IVPB in 100 mL NS (MBP)  2,000 mg Intravenous Q8H Dante Kemp, APRN        [COMPLETED] cloNIDine (CATAPRES) tablet 0.1 mg  0.1 mg Oral Once Magnus Hanson MD   0.1 mg at 09/02/24 1552    [COMPLETED] cloNIDine (CATAPRES) tablet 0.2 mg  0.2 mg Oral Once Dru Sarmiento DO   0.2 mg at  09/02/24 1800    dextrose (D50W) (25 g/50 mL) IV injection 25 g  25 g Intravenous Q15 Min PRN Long Greenberg MD        dextrose (GLUTOSE) oral gel 15 g  15 g Oral Q15 Min PRN Long Greenberg MD        [COMPLETED] droperidol (INAPSINE) injection 2.5 mg  2.5 mg Intravenous Once Dru Sarmiento DO   2.5 mg at 09/02/24 1630    Enoxaparin Sodium (LOVENOX) syringe 40 mg  40 mg Subcutaneous Q12H Dante Kemp APRN   40 mg at 09/03/24 0823    glucagon (GLUCAGEN) injection 1 mg  1 mg Intramuscular Q15 Min PRN Long Greenberg MD        [COMPLETED] hydrALAZINE (APRESOLINE) injection 20 mg  20 mg Intravenous Once Dru Sarmiento DO   20 mg at 09/02/24 2008    insulin regular (humuLIN R,novoLIN R) injection 2-7 Units  2-7 Units Subcutaneous Q6H Long Greenberg MD   2 Units at 09/03/24 0628    [COMPLETED] iopamidol (ISOVUE-300) 61 % injection 100 mL  100 mL Intravenous Once in imaging Magnus Hanson MD   100 mL at 09/02/24 1934    [COMPLETED] ketorolac (TORADOL) injection 15 mg  15 mg Intravenous Once Dru Sarmiento DO   15 mg at 09/02/24 1800    [COMPLETED] ketorolac (TORADOL) injection 15 mg  15 mg Intravenous Once Magnus Hanson MD   15 mg at 09/02/24 2057    labetalol (NORMODYNE,TRANDATE) injection 10 mg  10 mg Intravenous Q2H PRN Dante Kemp APRN   10 mg at 09/03/24 0558    LORazepam (ATIVAN) tablet 1 mg  1 mg Oral Q1H PRN Long Greenberg MD        Or    midazolam (VERSED) injection 2 mg  2 mg Intravenous Q1H PRLong Franco MD        Or    LORazepam (ATIVAN) tablet 2 mg  2 mg Oral Q1H PRN Long Greenberg MD        Or    midazolam (VERSED) injection 4 mg  4 mg Intravenous Q1H PRN Long Greenberg MD        Or    midazolam (VERSED) injection 4 mg  4 mg Intravenous Q15 Min PRN Long Greenberg MD        Or    midazolam (VERSED) injection 4 mg  4 mg Intramuscular Q15 Min PRN Long Greenberg MD        Magnesium Standard Dose Replacement - Follow Nurse / BPA Driven Protocol    Does not apply PRN Long Greenberg MD        magnesium sulfate 2g/50 mL (PREMIX) infusion  2 g Intravenous Q2H Dante Kemp APRN   2 g at 09/03/24 0822    [COMPLETED] midazolam (VERSED) injection 2 mg  2 mg Intravenous Once Magnus Hanson MD   2 mg at 09/02/24 2130    [COMPLETED] naloxone (NARCAN) 0.4 MG/ML injection  - ADS Override Pull        Given at 09/03/24 0031    pantoprazole (PROTONIX) injection 40 mg  40 mg Intravenous Q AM Long Greenberg MD   40 mg at 09/03/24 0551    Pharmacy to dose vancomycin   Does not apply Continuous PRN Dante Kemp APRN        Phosphorus Replacement - Follow Nurse / BPA Driven Protocol   Does not apply PRN Long Greenberg MD        potassium chloride 10 mEq in 100 mL IVPB  10 mEq Intravenous Q1H Dante Kemp APRN 100 mL/hr at 09/03/24 0823 10 mEq at 09/03/24 0823    [COMPLETED] potassium phosphates 15 mmol in sodium chloride 0.9 % 100 mL infusion  15 mmol Intravenous Once Dante Kemp APRN   15 mmol at 09/03/24 0459    Potassium Replacement - Follow Nurse / BPA Driven Protocol   Does not apply PRN Long Greenberg MD        Sodium Chloride (PF) 0.9 % 10 mL  10 mL Intravenous PRN Magnus Hanson MD        [COMPLETED] sodium chloride 0.9 % bolus 1,000 mL  1,000 mL Intravenous Once Magnus Hanson MD   Stopped at 09/02/24 1719    [COMPLETED] sodium chloride 0.9 % bolus 1,000 mL  1,000 mL Intravenous Once Dru Sarmiento DO   Stopped at 09/02/24 1848    [COMPLETED] sodium chloride 0.9 % bolus 1,000 mL  1,000 mL Intravenous Once Long Greenberg MD   Stopped at 09/03/24 0215    sodium chloride 0.9 % flush 10 mL  10 mL Intravenous Q12H Long Greenberg MD   10 mL at 09/03/24 0823    sodium chloride 0.9 % flush 10 mL  10 mL Intravenous PRN Long Greenberg MD        sodium chloride 0.9 % infusion 40 mL  40 mL Intravenous PRN Logn Greenberg MD        thiamine (B-1) 500 mg in sodium chloride 0.9 % 100 mL IVPB  500 mg Intravenous Q8H Viridiana  DAMEON Balbuena 200 mL/hr at 09/03/24 0456 500 mg at 09/03/24 0456    [COMPLETED] vancomycin 2250 mg/500 mL 0.9% NS IVPB ([facility])  20 mg/kg Intravenous Once Naman Marrufo PharmD   2,250 mg at 09/03/24 0456    Vancomycin HCl 1,250 mg in sodium chloride 0.9 % 250 mL VTB  11.1 mg/kg Intravenous Q12H Naman Marrufo PharmD         Lab Results (all)       Procedure Component Value Units Date/Time    MRSA Screen, PCR (Inpatient) - Swab, Nares [[id number]]  (Normal) Collected: 09/03/24 0331    Specimen: Swab from Nares Updated: 09/03/24 0857     MRSA PCR Negative    Narrative:      The negative predictive value of this diagnostic test is high and should only be used to consider de-escalating anti-MRSA therapy. A positive result may indicate colonization with MRSA and must be correlated clinically.  MRSA Negative    POC Glucose Once [[id number]]  (Abnormal) Collected: 09/03/24 0531    Specimen: Blood Updated: 09/03/24 0532     Glucose 192 mg/dL     Magnesium [[id number]]  (Abnormal) Collected: 09/03/24 0421    Specimen: Blood Updated: 09/03/24 0517     Magnesium 1.4 mg/dL     High Sensitivity Troponin T 2Hr [[id number]]  (Abnormal) Collected: 09/03/24 0421    Specimen: Blood Updated: 09/03/24 0514     HS Troponin T 20 ng/L      Troponin T Delta -2 ng/L     Narrative:      High Sensitive Troponin T Reference Range:  <14.0 ng/L- Negative Female for AMI  <22.0 ng/L- Negative Male for AMI  >=14 - Abnormal Female indicating possible myocardial injury.  >=22 - Abnormal Male indicating possible myocardial injury.   Clinicians would have to utilize clinical acumen, EKG, Troponin, and serial changes to determine if it is an Acute Myocardial Infarction or myocardial injury due to an underlying chronic condition.         Respiratory Panel PCR w/COVID-19(SARS-CoV-2) NAMRATA/LYDIA/MARIO/PAD/COR/NINFA In-House, NP Swab in UTM/VTM, 2 HR TAT - Swab, Nasopharynx [[id number]]  (Normal) Collected: 09/03/24 0331    Specimen: Swab from Nasopharynx  Updated: 09/03/24 0434     ADENOVIRUS, PCR Not Detected     Coronavirus 229E Not Detected     Coronavirus HKU1 Not Detected     Coronavirus NL63 Not Detected     Coronavirus OC43 Not Detected     COVID19 Not Detected     Human Metapneumovirus Not Detected     Human Rhinovirus/Enterovirus Not Detected     Influenza A PCR Not Detected     Influenza B PCR Not Detected     Parainfluenza Virus 1 Not Detected     Parainfluenza Virus 2 Not Detected     Parainfluenza Virus 3 Not Detected     Parainfluenza Virus 4 Not Detected     RSV, PCR Not Detected     Bordetella pertussis pcr Not Detected     Bordetella parapertussis PCR Not Detected     Chlamydophila pneumoniae PCR Not Detected     Mycoplasma pneumo by PCR Not Detected    Narrative:      In the setting of a positive respiratory panel with a viral infection PLUS a negative procalcitonin without other underlying concern for bacterial infection, consider observing off antibiotics or discontinuation of antibiotics and continue supportive care. If the respiratory panel is positive for atypical bacterial infection (Bordetella pertussis, Chlamydophila pneumoniae, or Mycoplasma pneumoniae), consider antibiotic de-escalation to target atypical bacterial infection.    Blood Culture - Blood, Arm, Right [[id number]] Collected: 09/03/24 0252    Specimen: Blood from Arm, Right Updated: 09/03/24 0412    Blood Culture - Blood, Hand, Left [[id number]] Collected: 09/03/24 0251    Specimen: Blood from Hand, Left Updated: 09/03/24 0411    HIV-1 / O / 2 Ag / Antibody [[id number]]  (Normal) Collected: 09/03/24 0250    Specimen: Blood Updated: 09/03/24 0355     HIV-1/ HIV-2 Non-Reactive    Narrative:      The HIV antibody/antigen combo assay is a qualitative assay for HIV that includes the p24 antigen as well as antibodies to HIV types 1 and 2. This test is intended to be used as a screening assay in the diagnosis of HIV infection in patients over the age of 2.  Results may be falsely decreased  if patient taking Biotin.      Hepatitis A and B Profile [[id number]] Collected: 09/03/24 0251    Specimen: Blood Updated: 09/03/24 0316    POC Glucose Once [[id number]]  (Abnormal) Collected: 09/03/24 0252    Specimen: Blood Updated: 09/03/24 0253     Glucose 175 mg/dL     Comprehensive Metabolic Panel [[id number]]  (Abnormal) Collected: 09/03/24 0128    Specimen: Blood Updated: 09/03/24 0233     Glucose 181 mg/dL      BUN 8 mg/dL      Creatinine 0.57 mg/dL      Sodium 134 mmol/L      Potassium 3.3 mmol/L      Comment: Slight hemolysis detected by analyzer. Result may be falsely elevated.        Chloride 98 mmol/L      CO2 18.0 mmol/L      Calcium 8.7 mg/dL      Total Protein 7.2 g/dL      Albumin 4.2 g/dL      ALT (SGPT) 10 U/L      AST (SGOT) 15 U/L      Alkaline Phosphatase 78 U/L      Total Bilirubin 0.7 mg/dL      Globulin 3.0 gm/dL      Comment: Calculated Result        A/G Ratio 1.4 g/dL      BUN/Creatinine Ratio 14.0     Anion Gap 18.0 mmol/L      eGFR 111.6 mL/min/1.73     Narrative:      GFR Normal >60  Chronic Kidney Disease <60  Kidney Failure <15      Phosphorus [[id number]]  (Abnormal) Collected: 09/03/24 0128    Specimen: Blood Updated: 09/03/24 0231     Phosphorus 2.2 mg/dL     Legionella Antigen, Urine - Urine, Urine, Clean Catch [[id number]] Collected: 09/02/24 1759    Specimen: Urine, Clean Catch Updated: 09/03/24 0217    S. Pneumo Ag Urine or CSF - Urine, Urine, Clean Catch [[id number]] Collected: 09/02/24 1759    Specimen: Urine, Clean Catch Updated: 09/03/24 0217    Procalcitonin [[id number]]  (Normal) Collected: 09/03/24 0128    Specimen: Blood Updated: 09/03/24 0214     Procalcitonin 0.02 ng/mL     Narrative:      As a Marker for Sepsis (Non-Neonates):    1. <0.5 ng/mL represents a low risk of severe sepsis and/or septic shock.  2. >2 ng/mL represents a high risk of severe sepsis and/or septic shock.    As a Marker for Lower Respiratory Tract Infections that require antibiotic therapy:    PCT on  "Admission    Antibiotic Therapy       6-12 Hrs later    >0.5                Strongly Recommended  >0.25 - <0.5        Recommended   0.1 - 0.25          Discouraged              Remeasure/reassess PCT  <0.1                Strongly Discouraged     Remeasure/reassess PCT    As 28 day mortality risk marker: \"Change in Procalcitonin Result\" (>80% or <=80%) if Day 0 (or Day 1) and Day 4 values are available. Refer to [email or web address]-[email or web address]-[email or web address]    Change in PCT <=80%  A decrease of PCT levels below or equal to 80% defines a positive change in PCT test result representing a higher risk for 28-day all-cause mortality of patients diagnosed with severe sepsis for septic shock.    Change in PCT >80%  A decrease of PCT levels of more than 80% defines a negative change in PCT result representing a lower risk for 28-day all-cause mortality of patients diagnosed with severe sepsis or septic shock.       TSH Rfx On Abnormal To Free T4 [[id number]]  (Normal) Collected: 09/03/24 0128    Specimen: Blood Updated: 09/03/24 0214     TSH 1.230 uIU/mL     proBNP [[id number]]  (Abnormal) Collected: 09/03/24 0128    Specimen: Blood Updated: 09/03/24 0214     proBNP 1,252.0 pg/mL     Narrative:      This assay is used as an aid in the diagnosis of individuals suspected of having heart failure. It can be used as an aid in the diagnosis of acute decompensated heart failure (ADHF) in patients presenting with signs and symptoms of ADHF to the emergency department (ED). In addition, NT-proBNP of <300 pg/mL indicates ADHF is not likely.    Age Range Result Interpretation  NT-proBNP Concentration (pg/mL:      <50             Positive            >450                   Gray                 300-450                    Negative             <300    50-75           Positive            >900                  Gray                300-900                  Negative            <300      >75             Positive            >1800                  [place]        "         300-1800                  Negative            <300    High Sensitivity Troponin T [[id number]]  (Abnormal) Collected: 09/03/24 0128    Specimen: Blood Updated: 09/03/24 0214     HS Troponin T 22 ng/L     Narrative:      High Sensitive Troponin T Reference Range:  <14.0 ng/L- Negative Female for AMI  <22.0 ng/L- Negative Male for AMI  >=14 - Abnormal Female indicating possible myocardial injury.  >=22 - Abnormal Male indicating possible myocardial injury.   Clinicians would have to utilize clinical acumen, EKG, Troponin, and serial changes to determine if it is an Acute Myocardial Infarction or myocardial injury due to an underlying chronic condition.         Basic Metabolic Panel [[id number]]  (Abnormal) Collected: 09/03/24 0128    Specimen: Blood Updated: 09/03/24 0208     Glucose 179 mg/dL      BUN 7 mg/dL      Creatinine 0.64 mg/dL      Sodium 134 mmol/L      Potassium 3.5 mmol/L      Comment: Slight hemolysis detected by analyzer. Result may be falsely elevated.        Chloride 98 mmol/L      CO2 20.0 mmol/L      Calcium 8.6 mg/dL      BUN/Creatinine Ratio 10.9     Anion Gap 16.0 mmol/L      eGFR 108.5 mL/min/1.73     Narrative:      GFR Normal >60  Chronic Kidney Disease <60  Kidney Failure <15      Magnesium [[id number]]  (Abnormal) Collected: 09/03/24 0128    Specimen: Blood Updated: 09/03/24 0208     Magnesium 1.4 mg/dL     D-dimer, Quantitative [[id number]]  (Abnormal) Collected: 09/03/24 0108    Specimen: Blood Updated: 09/03/24 0206     D-Dimer, Quantitative 1.22 MCGFEU/mL     Narrative:      According to the assay 's published package insert, a normal (<0.50 MCGFEU/mL) D-dimer result in conjunction with a non-high clinical probability assessment, excludes deep vein thrombosis (DVT) and pulmonary embolism (PE) with high sensitivity.    D-dimer values increase with age and this can make VTE exclusion of an older population difficult. To address this, the [employer],  "based on best available evidence and recent guidelines, recommends that clinicians use age-adjusted D-dimer thresholds in patients greater than 50 years of age with: a) a low probability of PE who do not meet all Pulmonary Embolism Rule Out Criteria, or b) in those with intermediate probability of PE.   The formula for an age-adjusted D-dimer cut-off is \"age/100\".  For example, a 60 year old patient would have an age-adjusted cut-off of 0.60 MCGFEU/mL and an 80 year old 0.80 MCGFEU/mL.    Ammonia [[id number]]  (Normal) Collected: 09/03/24 0128    Specimen: Blood Updated: 09/03/24 0203     Ammonia 35 umol/L     CBC (No Diff) [[id number]]  (Abnormal) Collected: 09/03/24 0108    Specimen: Blood Updated: 09/03/24 0201     WBC 20.47 10*3/mm3      RBC 5.48 10*6/mm3      Hemoglobin 16.3 g/dL      Hematocrit 46.4 %      MCV 84.7 fL      MCH 29.7 pg      MCHC 35.1 g/dL      RDW 13.1 %      RDW-SD 40.5 fl      MPV 11.2 fL      Platelets 195 10*3/mm3     Lactic Acid, Plasma [[id number]]  (Normal) Collected: 09/03/24 0128    Specimen: Blood Updated: 09/03/24 0159     Lactate 2.0 mmol/L      Comment: Falsely depressed results may occur on samples drawn from patients receiving N-Acetylcysteine (NAC) or Metamizole.       Pregnancy, Urine - Urine, Clean Catch [[id number]]  (Normal) Collected: 09/03/24 0106    Specimen: Urine, Clean Catch Updated: 09/03/24 0112     HCG, Urine QL Negative    POC Glucose Once [[id number]]  (Abnormal) Collected: 09/03/24 0036    Specimen: Blood Updated: 09/03/24 0037     Glucose 188 mg/dL     Blood Gas, Arterial With Co-Ox [[id number]]  (Abnormal) Collected: 09/03/24 0008    Specimen: Arterial Blood Updated: 09/03/24 0008     Site Left Radial     Quinton's Test N/A     pH, Arterial 7.442 pH units      pCO2, Arterial 31.3 mm Hg      Comment: 84 Value below reference range        pO2, Arterial 71.5 mm Hg      Comment: 84 Value below reference range        HCO3, Arterial 21.3 mmol/L      Base Excess, Arterial " -1.7 mmol/L      Hemoglobin, Blood Gas 15.7 g/dL      Hematocrit, Blood Gas 48.3 %      Oxyhemoglobin 94.3 %      Methemoglobin 0.10 %      Carboxyhemoglobin 1.3 %      CO2 Content 22.3 mmol/L      Temperature 37.0     Barometric Pressure for Blood Gas --     Comment: N/A        Modality Nasal Cannula     FIO2 32 %      Rate 0 Breaths/minute      PIP 0 cmH2O      Comment: Meter: [id number]     :  [id number]        IPAP 0     EPAP 0     pH, Temp Corrected 7.442 pH Units      pCO2, Temperature Corrected 31.3 mm Hg      pO2, Temperature Corrected 71.5 mm Hg     Urinalysis, Microscopic Only - Urine, Clean Catch [[id number]]  (Abnormal) Collected: 09/02/24 1759    Specimen: Urine, Clean Catch Updated: 09/02/24 1832     RBC, UA 3-5 /HPF      WBC, UA 0-2 /HPF      Bacteria, UA Trace /HPF      Squamous Epithelial Cells, UA 3-6 /HPF      Hyaline Casts, UA 3-6 /LPF      Mucus, UA Small/1+ /HPF      Methodology Manual Light Microscopy    Fentanyl, Urine - Urine, Clean Catch [[id number]]  (Abnormal) Collected: 09/02/24 1759    Specimen: Urine, Clean Catch Updated: 09/02/24 1820     Fentanyl, Urine Positive    Narrative:      Negative Threshold:      Fentanyl 5 ng/mL     The normal value for the drug tested is negative. This report includes final unconfirmed screening results to be used for medical treatment purposes only. Unconfirmed results must not be used for non-medical purposes such as employment or legal testing. Clinical consideration should be applied to any drug of abuse test, particularly when unconfirmed results are used.           Urine Drug Screen - Urine, Clean Catch [[id number]]  (Abnormal) Collected: 09/02/24 1759    Specimen: Urine, Clean Catch Updated: 09/02/24 1814     THC, Screen, Urine Negative     Phencyclidine (PCP), Urine Negative     Cocaine Screen, Urine Negative     Methamphetamine, Ur Negative     Opiate Screen Negative     Amphetamine Screen, Urine Negative     Benzodiazepine Screen, Urine  Positive     Tricyclic Antidepressants Screen Negative     Methadone Screen, Urine Negative     Barbiturates Screen, Urine Negative     Oxycodone Screen, Urine Negative     Buprenorphine, Screen, Urine Negative    Narrative:      Cutoff For Drugs Screened:    Amphetamines               500 ng/ml  Barbiturates               200 ng/ml  Benzodiazepines            150 ng/ml  Cocaine                    150 ng/ml  Methadone                  200 ng/ml  Opiates                    100 ng/ml  Phencyclidine               25 ng/ml  THC                         50 ng/ml  Methamphetamine            500 ng/ml  Tricyclic Antidepressants  300 ng/ml  Oxycodone                  100 ng/ml  Buprenorphine               10 ng/ml    The normal value for all drugs tested is negative. This report includes unconfirmed screening results, with the cutoff values listed, to be used for medical treatment purposes only.  Unconfirmed results must not be used for non-medical purposes such as employment or legal testing.  Clinical consideration should be applied to any drug of abuse test, particularly when unconfirmed results are used.      Urinalysis With Microscopic If Indicated (No Culture) - Urine, Clean Catch [[id number]]  (Abnormal) Collected: 09/02/24 1759    Specimen: Urine, Clean Catch Updated: 09/02/24 1809     Color, UA Yellow     Appearance, UA Clear     pH, UA 7.5     Specific Gravity, UA 1.025     Glucose,  mg/dL (Trace)     Ketones, UA 40 mg/dL (2+)     Bilirubin, UA Negative     Blood, UA Trace     Protein, UA Trace     Leuk Esterase, UA Negative     Nitrite, UA Negative     Urobilinogen, UA 1.0 E.U./dL    Comprehensive Metabolic Panel [[id number]]  (Abnormal) Collected: 09/02/24 1527    Specimen: Blood Updated: 09/02/24 1609     Glucose 181 mg/dL      BUN 8 mg/dL      Creatinine 0.61 mg/dL      Sodium 137 mmol/L      Potassium 4.0 mmol/L      Comment: Specimen hemolyzed.  Result may be falsely elevated.        Chloride 97 mmol/L       CO2 23.2 mmol/L      Calcium 9.7 mg/dL      Total Protein 8.1 g/dL      Albumin 4.6 g/dL      ALT (SGPT) 7 U/L      AST (SGOT) 28 U/L      Comment: Specimen hemolyzed.  Result may be falsely elevated.        Alkaline Phosphatase 85 U/L      Total Bilirubin 0.9 mg/dL      Globulin 3.5 gm/dL      A/G Ratio 1.3 g/dL      BUN/Creatinine Ratio 13.1     Anion Gap 16.8 mmol/L      eGFR 109.8 mL/min/1.73     Narrative:      GFR Normal >60  Chronic Kidney Disease <60  Kidney Failure <15      Lipase [[id number]]  (Normal) Collected: 09/02/24 1527    Specimen: Blood Updated: 09/02/24 1552     Lipase 20 U/L     [place] Draw [[id number]] Collected: 09/02/24 1527    Specimen: Blood Updated: 09/02/24 1545    Narrative:      The following orders were created for panel order [place] Draw.  Procedure                               Abnormality         Status                     ---------                               -----------         ------                     Green Top (Gel)[[id number]]                                  Final result               Lavender Top[[id number]]                                     Final result               Gold Top - SST[[id number]]                                   Final result               [place] Top[[id number]]                                         Final result               Light Blue Top[[id number]]                                   Final result                 Please view results for these tests on the individual orders.    Green Top (Gel) [[id number]] Collected: 09/02/24 1527    Specimen: Blood Updated: 09/02/24 1545     Extra Tube Hold for add-ons.     Comment: Auto resulted.       Lavender Top [[id number]] Collected: 09/02/24 1527    Specimen: Blood Updated: 09/02/24 1545     Extra Tube hold for add-on     Comment: Auto resulted       Gold Top - SST [[id number]] Collected: 09/02/24 1527    Specimen: Blood Updated: 09/02/24 1545     Extra Tube Hold for add-ons.     Comment: Auto resulted.       Gray Top  [[id number]] Collected: 09/02/24 1527    Specimen: Blood Updated: 09/02/24 1545     Extra Tube Hold for add-ons.     Comment: Auto resulted.       Light Blue Top [[id number]] Collected: 09/02/24 1527    Specimen: Blood Updated: 09/02/24 1545     Extra Tube Hold for add-ons.     Comment: Auto resulted       CBC & Differential [[id number]]  (Abnormal) Collected: 09/02/24 1527    Specimen: Blood Updated: 09/02/24 1538    Narrative:      The following orders were created for panel order CBC & Differential.  Procedure                               Abnormality         Status                     ---------                               -----------         ------                     CBC Auto Differential[[id number]]        Abnormal            Final result                 Please view results for these tests on the individual orders.    CBC Auto Differential [[id number]]  (Abnormal) Collected: 09/02/24 1527    Specimen: Blood Updated: 09/02/24 1538     WBC 13.02 10*3/mm3      RBC 5.79 10*6/mm3      Hemoglobin 16.8 g/dL      Hematocrit 49.1 %      MCV 84.8 fL      MCH 29.0 pg      MCHC 34.2 g/dL      RDW 12.8 %      RDW-SD 40.0 fl      MPV 10.9 fL      Platelets 233 10*3/mm3      Neutrophil % 81.9 %      Lymphocyte % 13.9 %      Monocyte % 3.8 %      Eosinophil % 0.1 %      Basophil % 0.1 %      Immature Grans % 0.2 %      Neutrophils, Absolute 10.68 10*3/mm3      Lymphocytes, Absolute 1.81 10*3/mm3      Monocytes, Absolute 0.49 10*3/mm3      Eosinophils, Absolute 0.01 10*3/mm3      Basophils, Absolute 0.01 10*3/mm3      Immature Grans, Absolute 0.02 10*3/mm3           Imaging Results (All)       Procedure Component Value Units Date/Time    XR Chest 1 View [[id number]] Collected: 09/03/24 0130     Updated: 09/03/24 0134    Narrative:      XR CHEST 1 VW    Date of Exam: 9/3/2024 12:47 AM EDT    Indication: Tachypnea    Comparison: Chest radiograph 2/11/2012    Findings:  The heart size and pulmonary vascular markings are normal. There  are no focal airspace consolidations to indicate pneumonia. The pleural spaces are clear. The osseous structures are normal.      Impression:      Impression:  No active disease.        Electronically Signed: Flash Stiles MD    9/3/2024 1:31 AM EDT    Workstation ID: [id number]    CT Abdomen Pelvis With Contrast [[id number]] Collected: 09/02/24 1943     Updated: 09/02/24 1950    Narrative:      CT ABDOMEN PELVIS W CONTRAST    Date of Exam: 9/2/2024 7:26 PM EDT    Indication: Abdominal pain.    Comparison: 6/20/2018    Technique: Axial CT images were obtained of the abdomen and pelvis following the uneventful intravenous administration of 100 cc Isovue-300 . Reconstructed coronal and sagittal images were also obtained. Automated exposure control and iterative   construction methods were used.      Findings:  Visualized lung bases are clear. Liver has lobular margins suggesting changes of cirrhosis. No focal hepatic mass identified. Patient is post cholecystectomy. Pancreas and spleen are within normal limits.    Bilateral adrenal glands are within normal limits. There is cortical scarring of the right kidney. Kidneys are otherwise unremarkable. No hydronephrosis. There is no abdominal or retroperitoneal adenopathy. The upper GI tract is within normal limits.    Pelvis: Urinary bladder is within normal limits. Patient is status post hysterectomy. GI tract including the appendix is normal. There is no pelvic or inguinal adenopathy. No free intraperitoneal fluid.    There are no lytic or sclerotic bony lesions identified.      Impression:      Impression:    1. Lobular liver margins compatible changes of cirrhosis.  2. No acute process seen within the abdomen or pelvis.        Electronically Signed: Dylan Almendarez MD    9/2/2024 7:47 PM EDT    Workstation ID: [id number]          ECG/EMG Results (all)       Procedure Component Value Units Date/Time    ECG 12 Lead Other; medical clearance [[id number]] Collected: 09/02/24 1525      Updated: 09/02/24 1527     QT Interval 458 ms      QTC Interval 472 ms     Narrative:      Test Reason : Other~  Blood Pressure :   */*   mmHG  Vent. Rate :  64 BPM     Atrial Rate :  64 BPM     P-R Int : 150 ms          QRS Dur :  78 ms      QT Int : 458 ms       P-R-T Axes :  73  22  21 degrees     QTc Int : 472 ms    Normal sinus rhythm with sinus arrhythmia  Normal ECG  When compared with ECG of 10-MAR-2015 10:26,  T wave amplitude has increased in Anterior leads    Referred By:            Confirmed By:     Telemetry Scan [[id number]] Resulted: 09/02/24     Updated: 09/02/24 1609    Adult Transthoracic Echo Complete W/ Cont if Necessary Per Protocol [[id number]] Resulted: 09/03/24 0901     Updated: 09/03/24 0901     EF(MOD-bp) 65.4 %      LVIDd 3.6 cm      LVIDs 2.5 cm      IVSd 0.90 cm      LVPWd 0.80 cm      FS 30.6 %      IVS/LVPW 1.13 cm      ESV(cubed) 15.6 ml      EDV(cubed) 46.7 ml      LV mass(C)d 85.6 grams      LVOT area 3.1 cm2      LVOT diam 2.00 cm      EDV(MOD-sp2) 109.0 ml      EDV(MOD-sp4) 107.0 ml      ESV(MOD-sp2) 37.3 ml      ESV(MOD-sp4) 39.2 ml      SV(MOD-sp2) 71.7 ml      SV(MOD-sp4) 67.8 ml      EF(MOD-sp2) 65.8 %      EF(MOD-sp4) 63.4 %      MV E max fabrice 80.4 cm/sec      MV A max fabrice 119.0 cm/sec      MV dec time 0.33 sec      MV E/A 0.68     LA ESV Index (BP) 8.7 ml/m2      Med Peak E' Fabrice 6.4 cm/sec      Lat Peak E' Fabrice 9.7 cm/sec      Avg E/e' ratio 9.99     SV(LVOT) 48.2 ml      RV Base 2.7 cm      RV Mid 2.00 cm      RV Length 6.7 cm      TAPSE (>1.6) 1.74 cm      RV S' 12.7 cm/sec      LA dimension (2D)  3.2 cm      LV V1 max 98.1 cm/sec      LV V1 max PG 3.9 mmHg      LV V1 mean PG 2.00 mmHg      LV V1 VTI 15.4 cm      Ao pk fabrice 134.0 cm/sec      Ao max PG 7.2 mmHg      Ao mean PG 4.0 mmHg      Ao V2 VTI 20.6 cm      SELMA(I,D) 2.35 cm2      MV max PG 4.0 mmHg      MV mean PG 2.00 mmHg      MV V2 VTI 13.2 cm      MV P1/2t 48.7 msec      MVA(P1/2t) 4.5 cm2      MVA(VTI) 3.7 cm2       MV dec slope 401.0 cm/sec2      PA V2 max 103.0 cm/sec      PA acc time 0.09 sec      Ao root diam 3.5 cm              Physician Progress Notes (all)        Sagrario Zaman MD at 09/03/24 0124            INTENSIVIST   PROGRESS NOTE       Subjective   SUBJECTIVE     Vashti 49 y.o. female is followed for:      Withdrawal from opioids    As an Intensivist, we provide an integrated approach to the ICU patient and family, medical management of comorbid conditions, including but not limited to electrolytes, glycemic control, organ dysfunction, lead interdisciplinary rounds and coordinate the care with all other services, including those from other specialists.     HPI:  Vashti is a 49 y.o. female, who presented to this Hospital on 9/2/2024 because of opioid withdrawal.  She has a past medical history of DM2, RONQUILLO, fibromyalgia, hep C and Heard's esophagus.  The patient presented to our ED after going to the [facility] for assessment of opiate rehab.  During her evaluation she was noted to be hypertensive with a systolic greater than 220 and she was experiencing withdrawal symptoms so she was sent to the [facility] ED for further evaluation.  Her UDS was positive for benzodiazepines and fentanyl.      We were consulted by the hospitalist due to the patient becoming hypotensive, tachypneic and obtunded.  The patient had been placed on Cardene drip and received multiple doses of clonidine, droperidol and midazolam while at [facility] ED.  She arrived to the telemetry floor around 11 PM and was noted to be hypotensive with a systolic in the 90s, tachypneic with periods of apnea and lethargic.  The Cardene drip was stopped and she received a 1 L NS bolus with improved blood pressure. She was also placed on BiPAP for tachypnea with some improvement. Upon bedside evaluation, pt appeared somnolent but is able to follow commands. She is oriented to person and place but not time. The patient will be transferred to the ICU for further evaluation  and management.       Temp  Min: 98.1 °F (36.7 °C)  Max: 98.7 °F (37.1 °C)     PMH: She  has a past medical history of Heard esophagus, Chronic pain, Diabetes mellitus, Fibromyalgia, Fracture of coccyx, Heartburn, Hepatitis C, History of sinus bradycardia, RONQUILLO (nonalcoholic steatohepatitis), Obesity, Osteoporosis, Reflex sympathetic dystrophy, and Rheumatoid factor positive.   PSxH: She  has a past surgical history that includes Cardiac catheterization; Hysterectomy; Cholecystectomy; Back surgery; Foot surgery; Dilation and curettage of uterus; Interventional radiology procedure (N/A, 2/19/2018); Liver biopsy; and Upper gastrointestinal endoscopy.     Medications:  No current facility-administered medications on file prior to encounter.     Current Outpatient Medications on File Prior to Encounter   Medication Sig    amLODIPine (NORVASC) 5 MG tablet TK 1 T PO QD    carisoprodol (SOMA) 350 MG tablet TK 1 T PO QID FOR 10 DAYS PRN P    cetirizine (zyrTEC) 10 MG tablet TAKE 1 TABLET BY MOUTH EVERY DAY FOR ALLERGIES    Cholecalciferol (VITAMIN D3) 5000 UNITS capsule capsule Take 5,000 Units by mouth 1 (one) time per week.    ciprofloxacin (CILOXAN) 0.3 % ophthalmic solution Apply 1-2 drops q 4 h to affected eye(s) while awake for 5 days or until symptoms resolve    diclofenac (VOLTAREN) 50 MG EC tablet TK 1 T PO TID FOR 10 DAYS    doxycycline (MONODOX) 100 MG capsule 1 po bid    furosemide (LASIX) 40 MG tablet Take 1 tablet by mouth As Needed.    lisinopril (PRINIVIL,ZESTRIL) 40 MG tablet Take 1 tablet by mouth Daily.    metFORMIN (GLUCOPHAGE) 1000 MG tablet TK 1 T PO BID    omega-3 acid ethyl esters (LOVAZA) 1 g capsule TK 1 C PO BID    omeprazole (priLOSEC) 40 MG capsule TK 1 C PO EACH DAY    potassium chloride (KLOR-CON) 20 MEQ packet Take 20 mEq by mouth As Needed.    potassium chloride 10 MEQ CR tablet Take 2 tablets by mouth Daily.    pravastatin (PRAVACHOL) 10 MG tablet Take 1 tablet by mouth Every Night.     Premarin 0.625 MG tablet Take 1 tablet by mouth Daily.    ribavirin (REBETOL) 200 MG capsule Take 200 mg by mouth 2 (two) times a day.    vitamin D (ERGOCALCIFEROL) 52583 UNITS capsule capsule TK 1 C PO FOR ONCE A WEEK        Allergies: She is allergic to lyrica [pregabalin] and reglan [metoclopramide].   FH: Her family history includes Asthma in her mother; COPD in her mother; Heart disease in her father.   SH: She  reports that she has been smoking cigarettes. She has never used smokeless tobacco. She reports that she does not drink alcohol and does not use drugs.     The patient's relevant past medical, surgical and social history were reviewed and updated in Epic as appropriate.        History     Last Reviewed by Magnus Hanson MD on 2024 at  3:28 PM    Sections Reviewed    Medical, Surgical, Tobacco, Alcohol, Drug Use, Sexual Activity, Custom,   Social Documentation, Socioeconomic, Family            Review of Systems  As described in the HPI.    Objective   OBJECTIVE     Vitals:  Temp: 98.7 °F (37.1 °C) (24) Temp  Min: 98.1 °F (36.7 °C)  Max: 98.7 °F (37.1 °C)   Temp core:      BP: 104/68 (24) BP  Min: 96/73  Max: 214/104   MAP (non-invasive) Noninvasive MAP (mmHg): 82 (24) Noninvasive MAP (mmHg)  Av.1  Min: 82  Max: 152   Pulse: 105 (24) Pulse  Min: 58  Max: 125   Resp: 26 (24) Resp  Min: 16  Max: 38   SpO2: 98 % (24) SpO2  Min: 97 %  Max: 100 %   Device: nasal cannula (24)    Flow Rate: 3 (24) Flow (L/min)  Min: 3  Max: 3         24  1522   Weight: 113 kg (250 lb)        Intake/Ouptut 24 hrs (7:00AM - 6:59 AM)  Intake & Output (last 2 days)          0701   0700  07 07    IV Piggyback  3000    Total Intake(mL/kg)  3000 (26.5)    Net  +3000                Physical Examination  Telemetry:  Rhythm: sinus tachycardia (24 2315)         Constitutional:  No acute distress.    Cardiovascular: RRR.    Respiratory: Normal breath sounds  No adventitious sounds   Abdominal:  Soft with no tenderness.   Extremities: No Edema   Neurological:   Awake.  Best Eye Response: 3-->(E3) to speech (09/02/24 2315)  Best Motor Response: 5-->(M5) localizes pain (09/02/24 2315)  Best Verbal Response: 4-->(V4) confused (09/02/24 2315)  [place] Coma Scale Score: 12 (09/02/24 2315)          Lines, Drains & Airways       Active LDAs       Name Placement date Placement time Site Days    Peripheral IV 09/02/24 1521 Right Antecubital 09/02/24  1521  Antecubital  less than 1                    Results Reviewed:  Laboratory  Microbiology  Radiology  Pathology    Hematology:  Results from last 7 days   Lab Units 09/02/24  1527   WBC 10*3/mm3 13.02*   HEMOGLOBIN g/dL 16.8*   MCV fL 84.8   PLATELETS 10*3/mm3 233     Results from last 7 days   Lab Units 09/02/24  1527   NEUTROS ABS 10*3/mm3 10.68*   LYMPHS ABS 10*3/mm3 1.81   EOS ABS 10*3/mm3 0.01     Chemistry:  Estimated Creatinine Clearance: 142.3 mL/min (by C-G formula based on SCr of 0.61 mg/dL).    Results from last 7 days   Lab Units 09/02/24  1527   SODIUM mmol/L 137   POTASSIUM mmol/L 4.0   CHLORIDE mmol/L 97*   CO2 mmol/L 23.2   BUN mg/dL 8   CREATININE mg/dL 0.61   GLUCOSE mg/dL 181*     Results from last 7 days   Lab Units 09/02/24  1527   CALCIUM mg/dL 9.7       Hepatic Panel:  Results from last 7 days   Lab Units 09/02/24  1527   ALBUMIN g/dL 4.6   TOTAL PROTEIN g/dL 8.1   BILIRUBIN mg/dL 0.9   AST (SGOT) U/L 28   ALT (SGPT) U/L 7   ALK PHOS U/L 85            U/A  Results from last 7 days   Lab Units 09/02/24  1759   COLOR UA  Yellow   CLARITY UA  Clear   PH, URINE  7.5   SPECIFIC GRAVITY, URINE  1.025   GLUCOSE UA  100 mg/dL (Trace)*   KETONES UA  40 mg/dL (2+)*   BILIRUBIN UA  Negative   PROTEIN UA  Trace*   BLOOD UA  Trace*   LEUKOCYTES UA  Negative   NITRITE UA  Negative   UROBILINOGEN UA  1.0 E.U./dL       Results from last 7 days   Lab Units  "09/02/24  1759   RBC UA /HPF 3-5*   WBC UA /HPF 0-2   BACTERIA UA /HPF Trace*   SQUAM EPITHEL UA /HPF 3-6*   HYALINE CASTS UA /LPF 3-6       COVID-19  No results found for: \"COVID19\"    Arterial Blood Gases:  Results from last 7 days   Lab Units 09/03/24  0008   PH, ARTERIAL pH units 7.442   PCO2, ARTERIAL mm Hg 31.3*   PO2 ART mm Hg 71.5*   FIO2 % 32       Images:  CT Abdomen Pelvis With Contrast    Result Date: 9/2/2024  Impression: 1. Lobular liver margins compatible changes of cirrhosis. 2. No acute process seen within the abdomen or pelvis. Electronically Signed: Dylan Almendarez MD  9/2/2024 7:47 PM EDT  Workstation ID: [id number]     Echo:  Results for orders placed during the hospital encounter of 07/11/16    Adult transthoracic echo complete    Interpretation Summary  · All left ventricular wall segments contract normally.  · Left ventricular wall thickness is consistent with mild concentric hypertrophy.  · Left atrial cavity size is mildly dilated.  · Estimated EF = 65%.  · Left ventricular diastolic dysfunction (grade I) consistent with impaired relaxation.  · Right ventricular cavity is borderline dilated.      Results: Reviewed.  I reviewed the patient's new laboratory and imaging results.  I independently reviewed the patient's new images.    Medications: Reviewed.    Assessment    A/P     Hospital:  LOS: 1 day   ICU: Patient does not have an ICU stay during this admission.     Active Hospital Problems    Diagnosis  POA    **Withdrawal from opioids [F11.93]  Yes     Vashti is a 49 y.o. female admitted on 9/2/2024 with Withdrawal from opioids [F11.93]    Assessment/Management/Treatment Plan:    //Acute Encephalopathy in setting of drug withdrawal   //LLL PNA  //HTN with possible flash pulmonary edema  //Hx Hep C and cirrhosis   //DMII  //GERD      Pulmonary   Bipap 34% FiO2, improved tachypnea  ABG adequate   CXR with LLE infiltrate and volume overload. Will diurese with 2 bumex  Ddimer pending, PE is low " "on differential. Chest CT pending   RVP, legionella, s pneumo, resp culture pending   Cardiovascular  BNP and trop pending  TTE pending  Neuro  Ammonia, TSH pending  ABG without hypercapnia  CTH pending  Protecting airway, follows commands, oriented X 2. No focal deficits. No concern for seizure activity. Monitor mental status closely.   GI/Hepatology  Ammonia and hep profile pending  NPO given mental status   Renal  Diurese, volume overloaded on CXR  No GAUDENCIO. I/O q1hr.   ID/Antibiotics  WBC 20, procal pending  Cefepime, azithro, vanc  Blood and resp cultures pending. Strep PNA, RVP, legionella pending  Hematology  Lovenox DVT prophy  No signs or symptoms of bleeding   Endocrine  Body mass index is 40.35 kg/m². Obese Class III extreme obesity: > or equal to 40kg/m2  Type 2 diabetes.    No results found for: \"HGBA1C\"  Results from last 7 days   Lab Units 09/03/24  0036   GLUCOSE mg/dL 188*       Diet: NPO Diet NPO Type: Strict NPO  No active supplement orders      Advance Directives: Code Status and Medical Interventions: CPR (Attempt to Resuscitate); Full Support   Ordered at: 09/02/24 5208     Code Status (Patient has no pulse and is not breathing):    CPR (Attempt to Resuscitate)     Medical Interventions (Patient has pulse or is breathing):    Full Support        VTE Prophylaxis:  Mechanical VTE prophylaxis orders are present.         Disposition: Admit to ICU    Plan of care and goals reviewed during interdisciplinary rounds.  I discussed the patient's findings and my recommendations with patient, nursing staff, and consulting provider    Time: was greater than 45 minutes. This is non-concurrent time.   (This excludes time spent performing separately reportable procedures and services).    She has a high risk of imminent or life-threatening  deterioration, which requires the highest level of physician preparedness to intervene urgently. I devoted my full attention to the direct care of this patient for the amount " of time indicated above.     Time spent with family or surrogate(s) is included only if the patient was incapable of providing the necessary information or participating in medical decision making.    She has the following organ/system impairments Respiratory Failure. Acute Encephalopathy      MDM:    Problem(s) Moderate due to: 1 undiagnosed new problem with uncertain prognosis  Data: Low due to: Review of result(s) of each unique test, Ordering of each unique test, and Assessment requiring an independent historian(s)  Risk: Moderate due to: prescription drug management      9 minutes of critical care provided by DAMEON Biswas in addendum accordance with split/shared billing. This time excludes other billable procedures. Time does include preparation of documents, medical consultations, review of old records, and direct bedside care. Patient is at high risk for life-threatening deterioration due to opiate withdrawal.   Electronically signed by DAMEON Aguilar, 09/03/24, 1:38 AM EDT.     [x] Primary Attending Intensive Care Medicine - Nutrition Support   [] Consultant    Copied text in this note has been reviewed and is accurate as of 09/03/24    Sagrario Zaman MD  Pulmonary Critical Care Medicine     Electronically signed by Sagrario Zaman MD at 09/03/24 0212       Consult Notes (all)    No notes of this type exist for this encounter.

## 2024-09-03 NOTE — H&P
[facility] Medicine Services  HISTORY AND PHYSICAL    Patient Name: Vashti Arredondo  : 1975  MRN: [id number]  Primary Care Physician: Jazmine Faustin APRN  Date of admission: 2024      Subjective   Subjective     Chief Complaint:  Withdrawal symptoms    HPI:  Vashti Arredondo is a 49 y.o. female with history of hepatitis C, GERD, type 2 diabetes, opiate use disorder.  She last used heroin yesterday and went to the [facility] for assessment for opiate rehabilitation.  During her evaluation she was noted to be hypertensive with systolic greater than 220 and she was experiencing withdrawal symptoms so she was sent to the ED for evaluation.  She went to [facility] and tested positive for benzodiazepines and fentanyl in her urine.  She is quite hypertensive and complaining of diffuse myalgias, nausea, diarrhea, and vomiting, abdominal pain, headache.  She is given several doses of antihypertensives and ultimately started on Cardene drip, as well as a dose of Versed for potential withdrawal.  On arrival here she is still on Cardene with a systolic pressure in the 90s and she is extremely somnolent and difficult to arouse.  She is able to follow simple commands only briefly.      Personal History     Past Medical History:   Diagnosis Date    Heard esophagus     Chronic pain     Diabetes mellitus     Fibromyalgia     Fracture of coccyx     Heartburn     Hepatitis C     History of sinus bradycardia     RONQUILLO (nonalcoholic steatohepatitis)     Obesity     Osteoporosis     Reflex sympathetic dystrophy     Rheumatoid factor positive            Past Surgical History:   Procedure Laterality Date    BACK SURGERY      CARDIAC CATHETERIZATION      CHOLECYSTECTOMY      DILATATION AND CURETTAGE      FOOT SURGERY      HYSTERECTOMY      INTERVENTIONAL RADIOLOGY PROCEDURE N/A 2018    Procedure: myelogram lumbar spine;  Surgeon: Octaviano Breen MD;  Location: [facility] INVASIVE LOCATION;   "Service:     LIVER BIOPSY      UPPER GASTROINTESTINAL ENDOSCOPY      diagnostic       Family History: family history includes Asthma in her mother; COPD in her mother; Heart disease in her father.     Social History:  reports that she has been smoking cigarettes. She has never used smokeless tobacco. She reports that she does not drink alcohol and does not use drugs.  Social History     Social History Narrative    Not on file       Medications:  Available home medication information reviewed.  amLODIPine, carisoprodol, cetirizine, ciprofloxacin, diclofenac, doxycycline, estrogens (conjugated), furosemide, lisinopril, metFORMIN, omega-3 acid ethyl esters, omeprazole, potassium chloride, pravastatin, ribavirin, vitamin D, and vitamin D3    Allergies   Allergen Reactions    Lyrica [Pregabalin] Other (See Comments) and Mental Status Change     Blisters in mouth    Reglan [Metoclopramide] Other (See Comments)     IV administration - pt states she \"felt really weird\"       Objective   Objective     Vital Signs:   Temp:  [98.1 °F (36.7 °C)-98.7 °F (37.1 °C)] 98.7 °F (37.1 °C)  Heart Rate:  [] 105  Resp:  [16-38] 26  BP: ()/() 104/68  Flow (L/min):  [3] 3       Physical Exam   Extremely somnolent, able to open eyes briefly with verbal prompting and looks at examiner, falls back asleep immediately  Pupils are equal and reactive to light  Neurological exam limited by mental status  Nonicteric, slightly diaphoretic  Heart rate irregular, appears to be having brief runs of SVT.  Lungs clear to auscultation bilaterally  She is snoring  Abdomen nontender nondistended  There is no peripheral edema  Extremities appear warm and well-perfused    Result Review:  I have personally reviewed the results from the time of this admission to 9/3/2024 00:03 EDT and agree with these findings:  [x]  Laboratory list / accordion  []  Microbiology  [x]  Radiology  [x]  EKG/Telemetry   []  Cardiology/Vascular   []  Pathology  []  " Old records  []  Other:  Most notable findings include: See A+P      LAB RESULTS:      Lab 09/02/24  1527   WBC 13.02*   HEMOGLOBIN 16.8*   HEMATOCRIT 49.1*   PLATELETS 233   NEUTROS ABS 10.68*   IMMATURE GRANS (ABS) 0.02   LYMPHS ABS 1.81   MONOS ABS 0.49   EOS ABS 0.01   MCV 84.8         Lab 09/02/24  1527   SODIUM 137   POTASSIUM 4.0   CHLORIDE 97*   CO2 23.2   ANION GAP 16.8*   BUN 8   CREATININE 0.61   EGFR 109.8   GLUCOSE 181*   CALCIUM 9.7         Lab 09/02/24  1527   TOTAL PROTEIN 8.1   ALBUMIN 4.6   GLOBULIN 3.5   ALT (SGPT) 7   AST (SGOT) 28   BILIRUBIN 0.9   ALK PHOS 85   LIPASE 20                     UA          9/2/2024    17:59   Urinalysis   Squamous Epithelial Cells, UA 3-6    Specific Gravity, UA 1.025    Ketones, UA 40 mg/dL (2+)    Blood, UA Trace    Leukocytes, UA Negative    Nitrite, UA Negative    RBC, UA 3-5    WBC, UA 0-2    Bacteria, UA Trace        Microbiology Results (last 10 days)       ** No results found for the last 240 hours. **            CT Abdomen Pelvis With Contrast    Result Date: 9/2/2024  CT ABDOMEN PELVIS W CONTRAST Date of Exam: 9/2/2024 7:26 PM EDT Indication: Abdominal pain. Comparison: 6/20/2018 Technique: Axial CT images were obtained of the abdomen and pelvis following the uneventful intravenous administration of 100 cc Isovue-300 . Reconstructed coronal and sagittal images were also obtained. Automated exposure control and iterative construction methods were used. Findings: Visualized lung bases are clear. Liver has lobular margins suggesting changes of cirrhosis. No focal hepatic mass identified. Patient is post cholecystectomy. Pancreas and spleen are within normal limits. Bilateral adrenal glands are within normal limits. There is cortical scarring of the right kidney. Kidneys are otherwise unremarkable. No hydronephrosis. There is no abdominal or retroperitoneal adenopathy. The upper GI tract is within normal limits. Pelvis: Urinary bladder is within normal  limits. Patient is status post hysterectomy. GI tract including the appendix is normal. There is no pelvic or inguinal adenopathy. No free intraperitoneal fluid. There are no lytic or sclerotic bony lesions identified.     Impression: Impression: 1. Lobular liver margins compatible changes of cirrhosis. 2. No acute process seen within the abdomen or pelvis. Electronically Signed: Dylan Almendarez MD  9/2/2024 7:47 PM EDT  Workstation ID: [id number]     Results for orders placed during the hospital encounter of 07/11/16    Adult transthoracic echo complete    Interpretation Summary  · All left ventricular wall segments contract normally.  · Left ventricular wall thickness is consistent with mild concentric hypertrophy.  · Left atrial cavity size is mildly dilated.  · Estimated EF = 65%.  · Left ventricular diastolic dysfunction (grade I) consistent with impaired relaxation.  · Right ventricular cavity is borderline dilated.      Assessment & Plan   Assessment & Plan       Withdrawal from opioids    Acute encephalopathy  -Suspect secondary to acute drop in blood pressure with antihypertensives.  This appears to be an acute change from the report I received earlier as her mental status was normal presentation  -Will check blood gas, start empiric BiPAP given brief apneic periods and snoring.    -Give fluid bolus now to bring pressure back up.    -If no response to the above will consider Narcan  -She did complain of a headache on presentation per chart and was hypertensive, will also send for stat CT head    Acute opiate withdrawal and suspected benzodiazepine withdrawal  -Utox + fentanyl and benzodiazepines. Had voluntarily checked herself in to the [facility] and willing to seek treatment. Endorsed heroin use on admission but not etoh or benzo use  -Start CIWA monitoring with PRN available given +utox, add scheduled if requiring PRN  -Will add adjunctive meds as needed  -CM    Marked hypertension  -SBP >200 on presentation  and resistant to antihypertensives. Suspect primarily driven by withdrawal. She received cardene and several acute antihypertensives and is now hypotensive  -TSH, Urine Hcg  -Discontinue cardene  -Give fluid bolus now    History of Hep C  Hepatic cirrhosis  -Prior steatosis but now appears to have progressed to cirrhosis. Looks like she was treated in 2016. Will check HIV status and hep A and B immunity status. Will need follow up on discharge. Hep A and B vaccination prior to discharge if needed    T2DM  -on GLP and orals at home. SSI here    GERD  -on PPI at home, dose IV while NPO        VTE Prophylaxis:  Mechanical VTE prophylaxis orders are present.          CODE STATUS:    Code Status and Medical Interventions: CPR (Attempt to Resuscitate); Full Support   Ordered at: 09/02/24 4976     Code Status (Patient has no pulse and is not breathing):    CPR (Attempt to Resuscitate)     Medical Interventions (Patient has pulse or is breathing):    Full Support       Expected Discharge   Expected discharge date/ time has not been documented.     Long Greenberg MD  09/03/24

## 2024-09-03 NOTE — PROGRESS NOTES
INTENSIVIST   PROGRESS NOTE          SUBJECTIVE     Vashti 49 y.o. female is followed for:      Withdrawal from opioids    As an Intensivist, we provide an integrated approach to the ICU patient and family, medical management of comorbid conditions, including but not limited to electrolytes, glycemic control, organ dysfunction, lead interdisciplinary rounds and coordinate the care with all other services, including those from other specialists.     HPI:  Vashti is a 49 y.o. female, who presented to this Hospital on 9/2/2024 because of opioid withdrawal.  She has a past medical history of DM2, RONQUILLO, fibromyalgia, hep C and Heard's esophagus.  The patient presented to our ED after going to the [facility] for assessment of opiate rehab.  During her evaluation she was noted to be hypertensive with a systolic greater than 220 and she was experiencing withdrawal symptoms so she was sent to the [facility] ED for further evaluation.  Her UDS was positive for benzodiazepines and fentanyl.      We were consulted by the hospitalist due to the patient becoming hypotensive, tachypneic and obtunded.  The patient had been placed on Cardene drip and received multiple doses of clonidine, droperidol and midazolam while at [facility] ED.  She arrived to the telemetry floor around 11 PM and was noted to be hypotensive with a systolic in the 90s, tachypneic with periods of apnea and lethargic.  The Cardene drip was stopped and she received a 1 L NS bolus with improved blood pressure. She was also placed on BiPAP for tachypnea with some improvement. Upon bedside evaluation, pt appeared somnolent but is able to follow commands. She is oriented to person and place but not time. The patient will be transferred to the ICU for further evaluation and management.       Temp  Min: 98.1 °F (36.7 °C)  Max: 98.7 °F (37.1 °C)     PMH: She  has a past medical history of Heard esophagus, Chronic pain, Diabetes mellitus, Fibromyalgia, Fracture of coccyx, Heartburn,  Hepatitis C, History of sinus bradycardia, RONQUILLO (nonalcoholic steatohepatitis), Obesity, Osteoporosis, Reflex sympathetic dystrophy, and Rheumatoid factor positive.   PSxH: She  has a past surgical history that includes Cardiac catheterization; Hysterectomy; Cholecystectomy; Back surgery; Foot surgery; Dilation and curettage of uterus; Interventional radiology procedure (N/A, 2/19/2018); Liver biopsy; and Upper gastrointestinal endoscopy.     Medications:  No current facility-administered medications on file prior to encounter.     Current Outpatient Medications on File Prior to Encounter   Medication Sig    amLODIPine (NORVASC) 5 MG tablet TK 1 T PO QD    carisoprodol (SOMA) 350 MG tablet TK 1 T PO QID FOR 10 DAYS PRN P    cetirizine (zyrTEC) 10 MG tablet TAKE 1 TABLET BY MOUTH EVERY DAY FOR ALLERGIES    Cholecalciferol (VITAMIN D3) 5000 UNITS capsule capsule Take 5,000 Units by mouth 1 (one) time per week.    ciprofloxacin (CILOXAN) 0.3 % ophthalmic solution Apply 1-2 drops q 4 h to affected eye(s) while awake for 5 days or until symptoms resolve    diclofenac (VOLTAREN) 50 MG EC tablet TK 1 T PO TID FOR 10 DAYS    doxycycline (MONODOX) 100 MG capsule 1 po bid    furosemide (LASIX) 40 MG tablet Take 1 tablet by mouth As Needed.    lisinopril (PRINIVIL,ZESTRIL) 40 MG tablet Take 1 tablet by mouth Daily.    metFORMIN (GLUCOPHAGE) 1000 MG tablet TK 1 T PO BID    omega-3 acid ethyl esters (LOVAZA) 1 g capsule TK 1 C PO BID    omeprazole (priLOSEC) 40 MG capsule TK 1 C PO EACH DAY    potassium chloride (KLOR-CON) 20 MEQ packet Take 20 mEq by mouth As Needed.    potassium chloride 10 MEQ CR tablet Take 2 tablets by mouth Daily.    pravastatin (PRAVACHOL) 10 MG tablet Take 1 tablet by mouth Every Night.    Premarin 0.625 MG tablet Take 1 tablet by mouth Daily.    ribavirin (REBETOL) 200 MG capsule Take 200 mg by mouth 2 (two) times a day.    vitamin D (ERGOCALCIFEROL) 16095 UNITS capsule capsule TK 1 C PO FOR ONCE A WEEK         Allergies: She is allergic to lyrica [pregabalin] and reglan [metoclopramide].   FH: Her family history includes Asthma in her mother; COPD in her mother; Heart disease in her father.   SH: She  reports that she has been smoking cigarettes. She has never used smokeless tobacco. She reports that she does not drink alcohol and does not use drugs.     The patient's relevant past medical, surgical and social history were reviewed and updated in Epic as appropriate.        History     Last Reviewed by Magnus Hanson MD on 2024 at  3:28 PM    Sections Reviewed    Medical, Surgical, Tobacco, Alcohol, Drug Use, Sexual Activity, Custom,   Social Documentation, Socioeconomic, Family            Review of Systems  As described in the HPI.       OBJECTIVE     Vitals:  Temp: 98.7 °F (37.1 °C) (24) Temp  Min: 98.1 °F (36.7 °C)  Max: 98.7 °F (37.1 °C)   Temp core:      BP: 104/68 (24) BP  Min: 96/73  Max: 214/104   MAP (non-invasive) Noninvasive MAP (mmHg): 82 (24) Noninvasive MAP (mmHg)  Av.1  Min: 82  Max: 152   Pulse: 105 (24) Pulse  Min: 58  Max: 125   Resp: 26 (24) Resp  Min: 16  Max: 38   SpO2: 98 % (24) SpO2  Min: 97 %  Max: 100 %   Device: nasal cannula (24)    Flow Rate: 3 (24) Flow (L/min)  Min: 3  Max: 3         24  1522   Weight: 113 kg (250 lb)        Intake/Ouptut 24 hrs (7:00AM - 6:59 AM)  Intake & Output (last 2 days)          0701   07 07 0700    IV Piggyback  3000    Total Intake(mL/kg)  3000 (26.5)    Net  +3000                Physical Examination  Telemetry:  Rhythm: sinus tachycardia (24)         Constitutional:  No acute distress.   Cardiovascular: RRR.    Respiratory: Normal breath sounds  No adventitious sounds   Abdominal:  Soft with no tenderness.   Extremities: No Edema   Neurological:   Awake.  Best Eye Response: 3-->(E3) to speech (24 [id number])  Best  "Motor Response: 5-->(M5) localizes pain (09/02/24 2315)  Best Verbal Response: 4-->(V4) confused (09/02/24 2315)  [place] Coma Scale Score: 12 (09/02/24 2315)          Lines, Drains & Airways       Active LDAs       Name Placement date Placement time Site Days    Peripheral IV 09/02/24 1521 Right Antecubital 09/02/24  1521  Antecubital  less than 1                    Results Reviewed:  Laboratory  Microbiology  Radiology  Pathology    Hematology:  Results from last 7 days   Lab Units 09/02/24  1527   WBC 10*3/mm3 13.02*   HEMOGLOBIN g/dL 16.8*   MCV fL 84.8   PLATELETS 10*3/mm3 233     Results from last 7 days   Lab Units 09/02/24  1527   NEUTROS ABS 10*3/mm3 10.68*   LYMPHS ABS 10*3/mm3 1.81   EOS ABS 10*3/mm3 0.01     Chemistry:  Estimated Creatinine Clearance: 142.3 mL/min (by C-G formula based on SCr of 0.61 mg/dL).    Results from last 7 days   Lab Units 09/02/24  1527   SODIUM mmol/L 137   POTASSIUM mmol/L 4.0   CHLORIDE mmol/L 97*   CO2 mmol/L 23.2   BUN mg/dL 8   CREATININE mg/dL 0.61   GLUCOSE mg/dL 181*     Results from last 7 days   Lab Units 09/02/24  1527   CALCIUM mg/dL 9.7       Hepatic Panel:  Results from last 7 days   Lab Units 09/02/24  1527   ALBUMIN g/dL 4.6   TOTAL PROTEIN g/dL 8.1   BILIRUBIN mg/dL 0.9   AST (SGOT) U/L 28   ALT (SGPT) U/L 7   ALK PHOS U/L 85            U/A  Results from last 7 days   Lab Units 09/02/24  1759   COLOR UA  Yellow   CLARITY UA  Clear   PH, URINE  7.5   SPECIFIC GRAVITY, URINE  1.025   GLUCOSE UA  100 mg/dL (Trace)*   KETONES UA  40 mg/dL (2+)*   BILIRUBIN UA  Negative   PROTEIN UA  Trace*   BLOOD UA  Trace*   LEUKOCYTES UA  Negative   NITRITE UA  Negative   UROBILINOGEN UA  1.0 E.U./dL       Results from last 7 days   Lab Units 09/02/24  1759   RBC UA /HPF 3-5*   WBC UA /HPF 0-2   BACTERIA UA /HPF Trace*   SQUAM EPITHEL UA /HPF 3-6*   HYALINE CASTS UA /LPF 3-6       COVID-19  No results found for: \"COVID19\"    Arterial Blood Gases:  Results from last 7 days   Lab " Units 09/03/24  0008   PH, ARTERIAL pH units 7.442   PCO2, ARTERIAL mm Hg 31.3*   PO2 ART mm Hg 71.5*   FIO2 % 32       Images:  CT Abdomen Pelvis With Contrast    Result Date: 9/2/2024  Impression: 1. Lobular liver margins compatible changes of cirrhosis. 2. No acute process seen within the abdomen or pelvis. Electronically Signed: Dylan Almendarez MD  9/2/2024 7:47 PM EDT  Workstation ID: [id number]     Echo:  Results for orders placed during the hospital encounter of 07/11/16    Adult transthoracic echo complete    Interpretation Summary  · All left ventricular wall segments contract normally.  · Left ventricular wall thickness is consistent with mild concentric hypertrophy.  · Left atrial cavity size is mildly dilated.  · Estimated EF = 65%.  · Left ventricular diastolic dysfunction (grade I) consistent with impaired relaxation.  · Right ventricular cavity is borderline dilated.      Results: Reviewed.  I reviewed the patient's new laboratory and imaging results.  I independently reviewed the patient's new images.    Medications: Reviewed.    Assessment    A/P     Hospital:  LOS: 1 day   ICU: Patient does not have an ICU stay during this admission.     Active Hospital Problems    Diagnosis  POA    **Withdrawal from opioids [F11.93]  Yes     Vashti is a 49 y.o. female admitted on 9/2/2024 with Withdrawal from opioids [F11.93]    Assessment/Management/Treatment Plan:    //Acute Encephalopathy in setting of drug withdrawal   //LLL PNA  //HTN with possible flash pulmonary edema  //Hx Hep C and cirrhosis   //DMII  //GERD      Pulmonary   Bipap 34% FiO2, improved tachypnea  ABG adequate   CXR with LLE infiltrate and volume overload. Will diurese with 2 bumex  Ddimer pending, PE is low on differential. Chest CT pending   RVP, legionella, s pneumo, resp culture pending   Cardiovascular  BNP and trop pending  TTE pending  Neuro  Ammonia, TSH pending  ABG without hypercapnia  CTH pending  Protecting airway, follows commands,  "oriented X 2. No focal deficits. No concern for seizure activity. Monitor mental status closely.   GI/Hepatology  Ammonia and hep profile pending  NPO given mental status   Renal  Diurese, volume overloaded on CXR  No GAUDENCIO. I/O q1hr.   ID/Antibiotics  WBC 20, procal pending  Cefepime, azithro, vanc  Blood and resp cultures pending. Strep PNA, RVP, legionella pending  Hematology  Lovenox DVT prophy  No signs or symptoms of bleeding   Endocrine  Body mass index is 40.35 kg/m². Obese Class III extreme obesity: > or equal to 40kg/m2  Type 2 diabetes.    No results found for: \"HGBA1C\"  Results from last 7 days   Lab Units 09/03/24  0036   GLUCOSE mg/dL 188*       Diet: NPO Diet NPO Type: Strict NPO  No active supplement orders      Advance Directives: Code Status and Medical Interventions: CPR (Attempt to Resuscitate); Full Support   Ordered at: 09/02/24 3348     Code Status (Patient has no pulse and is not breathing):    CPR (Attempt to Resuscitate)     Medical Interventions (Patient has pulse or is breathing):    Full Support        VTE Prophylaxis:  Mechanical VTE prophylaxis orders are present.         Disposition: Admit to ICU    Plan of care and goals reviewed during interdisciplinary rounds.  I discussed the patient's findings and my recommendations with patient, nursing staff, and consulting provider    Time: was greater than 45 minutes. This is non-concurrent time.   (This excludes time spent performing separately reportable procedures and services).    She has a high risk of imminent or life-threatening  deterioration, which requires the highest level of physician preparedness to intervene urgently. I devoted my full attention to the direct care of this patient for the amount of time indicated above.     Time spent with family or surrogate(s) is included only if the patient was incapable of providing the necessary information or participating in medical decision making.    She has the following organ/system " impairments Respiratory Failure. Acute Encephalopathy      MDM:    Problem(s) Moderate due to: 1 undiagnosed new problem with uncertain prognosis  Data: Low due to: Review of result(s) of each unique test, Ordering of each unique test, and Assessment requiring an independent historian(s)  Risk: Moderate due to: prescription drug management      9 minutes of critical care provided by DAMEON Biswas in addendum accordance with split/shared billing. This time excludes other billable procedures. Time does include preparation of documents, medical consultations, review of old records, and direct bedside care. Patient is at high risk for life-threatening deterioration due to opiate withdrawal.   Electronically signed by DAMEON Aguilar, 09/03/24, 1:38 AM EDT.     [x] Primary Attending Intensive Care Medicine - Nutrition Support   [] Consultant    Copied text in this note has been reviewed and is accurate as of 09/03/24    Sagrario Zaman MD  Pulmonary Critical Care Medicine

## 2024-09-03 NOTE — ED NOTES
Vashti Arredondo    Nursing Report ED to Floor:  Mental status: Alert X 4  Ambulatory status: With assistance X 1  Oxygen Therapy:  Room air  Cardiac Rhythm: Normal sinus   Admitted from: Home  Safety Concerns:  Ambulatory with assistance  Social Issues: None  ED Room #:  14    ED Nurse Phone Extension - [phone] or may call [phone].      HPI:   Chief Complaint   Patient presents with    Abdominal Pain       Past Medical History:  Past Medical History:   Diagnosis Date    Heard esophagus     Chronic pain     Diabetes mellitus     Fibromyalgia     Fracture of coccyx     Heartburn     Hepatitis C     History of sinus bradycardia     RONQUILLO (nonalcoholic steatohepatitis)     Obesity     Osteoporosis     Reflex sympathetic dystrophy     Rheumatoid factor positive         Past Surgical History:  Past Surgical History:   Procedure Laterality Date    BACK SURGERY      CARDIAC CATHETERIZATION      CHOLECYSTECTOMY      DILATATION AND CURETTAGE      FOOT SURGERY      HYSTERECTOMY      INTERVENTIONAL RADIOLOGY PROCEDURE N/A 2/19/2018    Procedure: myelogram lumbar spine;  Surgeon: Octaviano Breen MD;  Location: [facility] INVASIVE LOCATION;  Service:     LIVER BIOPSY      UPPER GASTROINTESTINAL ENDOSCOPY      diagnostic        Admitting Doctor:   No admitting provider for patient encounter.    Consulting Provider(s):  Consults       No orders found from 8/4/2024 to 9/3/2024.             Admitting Diagnosis:   The primary encounter diagnosis was Resistant hypertension. A diagnosis of Opioid withdrawal was also pertinent to this visit.    Most Recent Vitals:   Vitals:    09/02/24 2041 09/02/24 2045 09/02/24 2100 09/02/24 2129   BP: (!) 199/111 (!) 198/111 177/89 166/87   BP Location:   Right arm Right arm   Patient Position:  Lying Lying Lying   Pulse: 85 100 114 (!) 125   Resp:  24     Temp:       TempSrc:       SpO2:  99% 98% 97%   Weight:       Height:           Active LDAs/IV Access:   Lines, Drains & Airways       Active LDAs        Name Placement date Placement time Site Days    Peripheral IV 09/02/24 1521 Right Antecubital 09/02/24  1521  Antecubital  less than 1                    Labs (abnormal labs have a star):   Labs Reviewed   COMPREHENSIVE METABOLIC PANEL - Abnormal; Notable for the following components:       Result Value    Glucose 181 (*)     Chloride 97 (*)     Anion Gap 16.8 (*)     All other components within normal limits    Narrative:     GFR Normal >60  Chronic Kidney Disease <60  Kidney Failure <15     URINALYSIS W/ MICROSCOPIC IF INDICATED (NO CULTURE) - Abnormal; Notable for the following components:    Glucose,  mg/dL (Trace) (*)     Ketones, UA 40 mg/dL (2+) (*)     Blood, UA Trace (*)     Protein, UA Trace (*)     All other components within normal limits   CBC WITH AUTO DIFFERENTIAL - Abnormal; Notable for the following components:    WBC 13.02 (*)     RBC 5.79 (*)     Hemoglobin 16.8 (*)     Hematocrit 49.1 (*)     Neutrophil % 81.9 (*)     Lymphocyte % 13.9 (*)     Monocyte % 3.8 (*)     Eosinophil % 0.1 (*)     Neutrophils, Absolute 10.68 (*)     All other components within normal limits   URINE DRUG SCREEN - Abnormal; Notable for the following components:    Benzodiazepine Screen, Urine Positive (*)     All other components within normal limits    Narrative:     Cutoff For Drugs Screened:    Amphetamines               500 ng/ml  Barbiturates               200 ng/ml  Benzodiazepines            150 ng/ml  Cocaine                    150 ng/ml  Methadone                  200 ng/ml  Opiates                    100 ng/ml  Phencyclidine               25 ng/ml  THC                         50 ng/ml  Methamphetamine            500 ng/ml  Tricyclic Antidepressants  300 ng/ml  Oxycodone                  100 ng/ml  Buprenorphine               10 ng/ml    The normal value for all drugs tested is negative. This report includes unconfirmed screening results, with the cutoff values listed, to be used for medical treatment  purposes only.  Unconfirmed results must not be used for non-medical purposes such as employment or legal testing.  Clinical consideration should be applied to any drug of abuse test, particularly when unconfirmed results are used.     FENTANYL, URINE - Abnormal; Notable for the following components:    Fentanyl, Urine Positive (*)     All other components within normal limits    Narrative:     Negative Threshold:      Fentanyl 5 ng/mL     The normal value for the drug tested is negative. This report includes final unconfirmed screening results to be used for medical treatment purposes only. Unconfirmed results must not be used for non-medical purposes such as employment or legal testing. Clinical consideration should be applied to any drug of abuse test, particularly when unconfirmed results are used.          URINALYSIS, MICROSCOPIC ONLY - Abnormal; Notable for the following components:    RBC, UA 3-5 (*)     Bacteria, UA Trace (*)     Squamous Epithelial Cells, UA 3-6 (*)     Mucus, UA Small/1+ (*)     All other components within normal limits   LIPASE - Normal   RAINBOW DRAW    Narrative:     The following orders were created for panel order [place] Draw.  Procedure                               Abnormality         Status                     ---------                               -----------         ------                     Green Top (Gel)[[id number]]                                  Final result               Lavender Top[[id number]]                                     Final result               Gold Top - SST[[id number]]                                   Final result               [place] Top[[id number]]                                         Final result               Light Blue Top[[id number]]                                   Final result                 Please view results for these tests on the individual orders.   CBC AND DIFFERENTIAL    Narrative:     The following orders were created for panel order CBC &  Differential.  Procedure                               Abnormality         Status                     ---------                               -----------         ------                     CBC Auto Differential[[id number]]        Abnormal            Final result                 Please view results for these tests on the individual orders.   GREEN TOP   LAVENDER TOP   GOLD TOP - SST   GRAY TOP   LIGHT BLUE TOP       Meds Given in ED:   Medications   Sodium Chloride (PF) 0.9 % 10 mL (has no administration in time range)   hydrALAZINE (APRESOLINE) injection 20 mg (has no administration in time range)   niCARdipine (CARDENE) 25mg in 250mL NS infusion (5 mg/hr Intravenous New Bag 9/2/24 2041)   sodium chloride 0.9 % bolus 1,000 mL (0 mL Intravenous Stopped 9/2/24 1719)   cloNIDine (CATAPRES) tablet 0.1 mg (0.1 mg Oral Given 9/2/24 1552)   droperidol (INAPSINE) injection 2.5 mg (2.5 mg Intravenous Given 9/2/24 1630)   sodium chloride 0.9 % bolus 1,000 mL (0 mL Intravenous Stopped 9/2/24 1848)   ketorolac (TORADOL) injection 15 mg (15 mg Intravenous Given 9/2/24 1800)   cloNIDine (CATAPRES) tablet 0.2 mg (0.2 mg Oral Given 9/2/24 1800)   hydrALAZINE (APRESOLINE) injection 20 mg (20 mg Intravenous Given 9/2/24 2008)   iopamidol (ISOVUE-300) 61 % injection 100 mL (100 mL Intravenous Given 9/2/24 1934)   ketorolac (TORADOL) injection 15 mg (15 mg Intravenous Given 9/2/24 2057)   midazolam (VERSED) injection 2 mg (2 mg Intravenous Given 9/2/24 2130)     niCARdipine, 5-15 mg/hr, Last Rate: 5 mg/hr (09/02/24 2041)         Last NIH score:                                                          Dysphagia screening results:  Patient Factors Component (Dysphagia:Stroke or Rule-out)  Best Eye Response: 4-->(E4) spontaneous (09/02/24 2015)  Best Motor Response: 6-->(M6) obeys commands (09/02/24 2015)  Best Verbal Response: 5-->(V5) oriented (09/02/24 2015)  [place] Coma Scale Score: 15 (09/02/24 2015)     [place] Coma Scale:  No data  recorded     CIWA:        Restraint Type:            Isolation Status:  No active isolations

## 2024-09-03 NOTE — PROGRESS NOTES
"Pharmacy Consult-Vancomycin Dosing  Vashti Arredondo is a  49 y.o. female receiving vancomycin therapy.     Indication: pneumonia  Consulting Provider: intensivist  ID Consult: No    Goal AUC: 400 - 600 mg/L*hr    Current Antimicrobial Therapy  Anti-Infectives (From admission, onward)      Ordered     Dose/Rate Route Frequency Start Stop    09/03/24 0134  cefepime 2000 mg IVPB in 100 mL NS (MBP)        Ordering Provider: Dante Kemp APRN    2,000 mg  over 4 Hours Intravenous Every 8 Hours 09/03/24 1000 09/08/24 0959    09/03/24 0134  azithromycin (ZITHROMAX) 500 mg in sodium chloride 0.9 % 250 mL IVPB-VTB        Ordering Provider: Dante Kemp APRN    500 mg  over 60 Minutes Intravenous Every 24 Hours 09/03/24 0230 09/06/24 0229    09/03/24 0134  cefepime 2000 mg IVPB in 100 mL NS (MBP)        Ordering Provider: Dante Kemp APRN    2,000 mg  over 30 Minutes Intravenous Once 09/03/24 0230      09/03/24 0134  Pharmacy to dose vancomycin        Ordering Provider: Dante Kemp APRN     Does not apply Continuous PRN 09/03/24 0134 09/08/24 0133            Allergies  Allergies as of 09/02/2024 - Reviewed 09/02/2024   Allergen Reaction Noted    Lyrica [pregabalin] Other (See Comments) and Mental Status Change 01/25/2018    Reglan [metoclopramide] Other (See Comments) 01/25/2018       Labs  Results from last 7 days   Lab Units 09/02/24  1527   BUN mg/dL 8   CREATININE mg/dL 0.61     Results from last 7 days   Lab Units 09/02/24  1527   WBC 10*3/mm3 13.02*       Evaluation of Dosing  Last Dose Received in the ED/Outside Facility:               N/A  Is Patient on Dialysis or Renal Replacement:               No    Ht - 167.6 cm (66\")  Wt - 113 kg (250 lb)    Estimated Creatinine Clearance: 142.3 mL/min (by C-G formula based on SCr of 0.61 mg/dL).  Intake & Output (last 3 days)         08/31 0701 09/01 0700 09/01 0701 09/02 0700 09/02 0701 09/03 0700    IV Piggyback   3000    Total " Intake(mL/kg)   3000 (26.5)    Net   +3000                 Microbiology and Radiology  Microbiology Results (last 10 days)       ** No results found for the last 240 hours. **            Reported Vancomycin Levels                   InsightRX AUC Calculation:  Current AUC:   -- mg/L*hr    Predicted Steady State AUC on Current Dose: -- mg/L*hr  _________________________________  Predicted Steady State AUC on New Dose:   473 mg/L*hr    Assessment/Plan:  1. Will give vancomycin 2250 mg IV once                               followed by      Vancomycin 1250 mg IV q 12 hours    2. Vancomycin trough is scheduled 9/5 at 06:00 prior to the 5th dose. Predicted Steady State AUC at current dose: 473 mg/L*hr.      Naman Marrufo, PharmD, [facility]  9/3/2024  01:39 EDT

## 2024-09-03 NOTE — PLAN OF CARE
Problem: Noninvasive Ventilation Acute  Goal: Effective Unassisted Ventilation and Oxygenation  9/3/2024 0024 by Shahana Garcia RRT  Outcome: Ongoing, Progressing     Problem: Noninvasive Ventilation Acute  Goal: Effective Unassisted Ventilation and Oxygenation  Intervention: Monitor and Manage Noninvasive Ventilation  9/3/2024 0024 by Shahana Garcia RRT  Flowsheets  Taken 9/3/2024 0024  Airway/Ventilation Management: --  NPPV/CPAP Maintenance: --  Taken 9/3/2024 0016  Airway/Ventilation Management:   airway patency maintained   calming measures promoted  NPPV/CPAP Maintenance:   mask adjusted   mask secure   Goal Outcome Evaluation:         CONTINUE NIV SUPPORT

## 2024-09-03 NOTE — PAYOR COMM NOTE
"Auth# [id number]   9/3/24 Discharge Date/AMA signed    KOLBY Waterman, RN  Utilization Review  Phone [phone]  Fax [phone]    [place] [address]          Beth Chopra, RN   Registered Nurse  Nursing     Nursing Note      Addendum     Date of Service: 09/03/24 1402  Creation Time: 09/03/24 1405       Patient requested to leave AMA. Nurse practitioner @ bedside to explain risks.      Security also present when belongings returned. Patient stated everything thing was present that she had when she arrived at our facility. (Phone, money, cigarettes, clothing, and shoes.)      PICC line removed before patient left.      Patient signed AMA paperwork and left ambulatory with all belongings.         Revision History             Vashti Arredondo (49 y.o. Female)       Date of Birth   1975    Social Security Number       Address   [address] [place] [address] [address] [place], [place] [address]    Home Phone   [phone]    MRN   [id number]       [employer]   [employer]    Marital Status                               Admission Date   9/2/24    Admission Type   Emergency    Admitting Provider   Sagrario Zaman MD    Attending Provider       Department, Room/Bed   [facility] 2B ICU, N234/1       Discharge Date   9/3/2024    Discharge Disposition   Left Against Medical Advice    Discharge Destination   Home                              Attending Provider: (none)   Allergies: Lyrica [Pregabalin], Reglan [Metoclopramide]    Isolation: None   Infection: None   Code Status: CPR    Ht: 167.6 cm (66\")   Wt: 113 kg (250 lb)    Admission Cmt: None   Principal Problem: Withdrawal from opioids [F11.93]                   Active Insurance as of 9/2/2024       Primary Coverage       Payor Plan Insurance Group Employer/Plan Group    [employer] [place] [employer] MEDICAID        Payor Plan Address Payor Plan Phone Number Payor Plan Fax Number Effective Dates    "  [address] [address] [phone]  6/23/2016 - None Entered    [facility] [id number]         Subscriber Name Subscriber Birth Date Member ID       MARISSA ARREDONDO 1975 [id number]                     Emergency Contacts        (Rel.) Home Phone Work Phone Mobile Phone    Haroon Nesbitt (Son) -- -- [phone]    Lucas Arredondo (Daughter) -- -- [phone]                 Discharge Summary        Isi Haywood APRN at 09/03/24 1353            Admit date: 9/2/2024  Date of Discharge:  9/3/2024    Discharge Diagnosis:   Problems Addressed this Visit    None  Visit Diagnoses       Resistant hypertension    -  Primary    Relevant Medications    cloNIDine (CATAPRES) tablet 0.1 mg (Completed)    cloNIDine (CATAPRES) tablet 0.2 mg (Completed)    hydrALAZINE (APRESOLINE) injection 20 mg (Completed)    bumetanide (BUMEX) injection 2 mg (Completed)    labetalol (NORMODYNE,TRANDATE) injection 10 mg    Opioid withdrawal              Diagnoses         Codes Comments    Resistant hypertension    -  Primary ICD-10-CM: I1A.0  ICD-9-CM: 401.9     Opioid withdrawal     ICD-10-CM: F11.93  ICD-9-CM: 292.0, 305.50           Hospital Course:  Ms. Arredondo is a 49 y.o. female, who presented to this Hospital on 9/2/2024 because of opioid withdrawal.  She has a past medical history of DM2, RONQUILLO, fibromyalgia, hep C and Heard's esophagus.  The patient presented to our ED after going to the [facility] for assessment of opiate rehab.  During her evaluation she was noted to be hypertensive with a systolic greater than 220 and experiencing withdrawal symptoms. She was directed to seek ED evaluation for which she presented to [facility]. She was subsequently admitted to [facility] Hospitalist service. Shortly thereafter she became hypotensive, tachypneic and obtunded. Patient was therefore transferred to the ICU. The Cardene drip was stopped and she was given a 1 L NS bolus with improved blood pressure. She was placed on BiPAP for  tachypnea with improvement. Patient's mentation improved and she was able to come off BiPAP overnight. She was originally placed on Abx for possible PNA. CT chest ruled out pulmonary infection so antibiotics were discontinued. CT head revealed no acute process. On 9//3/24, patient requested to leave AMA. She was felt to be of sound mind and understands the risks with leaving AMA. She will arrange for an Uber home.       Chief Complaint   Patient presents with    Abdominal Pain     Pertinent Test Results:   Results from last 7 days   Lab Units 09/03/24  0108   WBC 10*3/mm3 20.47*   HEMOGLOBIN g/dL 16.3*   HEMATOCRIT % 46.4   PLATELETS 10*3/mm3 195     Results from last 7 days   Lab Units 09/03/24  0128 09/02/24  1527   SODIUM mmol/L 134*  134* 137   POTASSIUM mmol/L 3.3*  3.5 4.0   CHLORIDE mmol/L 98  98 97*   CO2 mmol/L 18.0*  20.0* 23.2   BUN mg/dL 8  7 8   CREATININE mg/dL 0.57  0.64 0.61   EGFR mL/min/1.73 111.6  108.5 109.8   GLUCOSE mg/dL 181*  179* 181*   CALCIUM mg/dL 8.7  8.6 9.7   PHOSPHORUS mg/dL 2.2*  --      Condition on Discharge:  stable.    Vital Signs  Temp:  [97.6 °F (36.4 °C)-98.7 °F (37.1 °C)] 97.6 °F (36.4 °C)  Heart Rate:  [] 97  Resp:  [16-38] 20  BP: ()/() 141/105  Physical Examination copied from note 9/2/24.  Telemetry:  Rhythm: sinus tachycardia (09/02/24 2315)   Constitutional:  No acute distress.   Cardiovascular: RRR.    Respiratory: Normal breath sounds  No adventitious sounds   Abdominal:  Soft with no tenderness.   Extremities: No Edema   Neurological:             Awake.  Best Eye Response: 3-->(E3) to speech (09/02/24 2315)  Best Motor Response: 5-->(M5) localizes pain (09/02/24 2315)  Best Verbal Response: 4-->(V4) confused (09/02/24 2315)  [place] Coma Scale Score: 12 (09/02/24 2315)     Discharge Disposition: Patient left AMA.      Discharge Medications:     Discharge Medications        Continue These Medications        Instructions Start Date    amLODIPine 5 MG tablet  Commonly known as: NORVASC   TK 1 T PO QD      carisoprodol 350 MG tablet  Commonly known as: SOMA   TK 1 T PO QID FOR 10 DAYS PRN P      cetirizine 10 MG tablet  Commonly known as: zyrTEC   TAKE 1 TABLET BY MOUTH EVERY DAY FOR ALLERGIES      diclofenac 50 MG EC tablet  Commonly known as: VOLTAREN   TK 1 T PO TID FOR 10 DAYS      furosemide 40 MG tablet  Commonly known as: LASIX   40 mg, Oral, As Needed      lisinopril 40 MG tablet  Commonly known as: PRINIVIL,ZESTRIL   1 tablet, Oral, Daily      metFORMIN 1000 MG tablet  Commonly known as: GLUCOPHAGE   TK 1 T PO BID      potassium chloride 10 MEQ CR tablet   2 tablets, Oral, Daily      potassium chloride 20 MEQ packet  Commonly known as: KLOR-CON   20 mEq, Oral, As Needed      pravastatin 10 MG tablet  Commonly known as: PRAVACHOL   10 mg, Oral, Nightly      Premarin 0.625 MG tablet  Generic drug: estrogens (conjugated)   1 tablet, Oral, Daily      ribavirin 200 MG capsule  Commonly known as: REBETOL   200 mg, Oral, 2 Times Daily      vitamin D3 125 MCG (5000 UT) capsule capsule   5,000 Units, Oral, Weekly               Discharge Diet:     Activity at Discharge:     Follow-up Appointments  No future appointments.      Test Results Pending at Discharge  Pending Labs       Order Current Status    Blood Culture - Blood, Arm, Right In process    Blood Culture - Blood, Hand, Left In process             DAMEON Arboleda  09/03/24  13:54 EDT    Time: Discharge 10 min    Electronically signed by Isi Haywood APRN at 09/03/24 2428       Discharge Order (From admission, onward)      None

## 2024-09-08 LAB
BACTERIA SPEC AEROBE CULT: NORMAL
BACTERIA SPEC AEROBE CULT: NORMAL

## 2024-12-25 LAB
QT INTERVAL: 458 MS
QTC INTERVAL: 472 MS

## 2025-04-17 ENCOUNTER — HOSPITAL ENCOUNTER (EMERGENCY)
Facility: HOSPITAL | Age: 50
Discharge: LEFT BEFORE DISCHARGE | End: 2025-04-18
Attending: STUDENT IN AN ORGANIZED HEALTH CARE EDUCATION/TRAINING PROGRAM
Payer: COMMERCIAL

## 2025-04-17 ENCOUNTER — APPOINTMENT (OUTPATIENT)
Dept: GENERAL RADIOLOGY | Facility: HOSPITAL | Age: 50
End: 2025-04-17
Payer: COMMERCIAL

## 2025-04-17 ENCOUNTER — APPOINTMENT (OUTPATIENT)
Dept: CT IMAGING | Facility: HOSPITAL | Age: 50
End: 2025-04-17
Payer: COMMERCIAL

## 2025-04-17 DIAGNOSIS — R07.9 CHEST PAIN, UNSPECIFIED TYPE: Primary | ICD-10-CM

## 2025-04-17 DIAGNOSIS — E87.6 HYPOKALEMIA: ICD-10-CM

## 2025-04-17 DIAGNOSIS — R11.2 NAUSEA AND VOMITING, UNSPECIFIED VOMITING TYPE: ICD-10-CM

## 2025-04-17 LAB
ALBUMIN SERPL-MCNC: 3.9 G/DL (ref 3.5–5.2)
ALBUMIN/GLOB SERPL: 1.5 G/DL
ALP SERPL-CCNC: 73 U/L (ref 39–117)
ALT SERPL W P-5'-P-CCNC: 15 U/L (ref 1–33)
ANION GAP SERPL CALCULATED.3IONS-SCNC: 10.6 MMOL/L (ref 5–15)
AST SERPL-CCNC: 14 U/L (ref 1–32)
BASOPHILS # BLD AUTO: 0.04 10*3/MM3 (ref 0–0.2)
BASOPHILS NFR BLD AUTO: 0.3 % (ref 0–1.5)
BILIRUB SERPL-MCNC: 0.4 MG/DL (ref 0–1.2)
BUN SERPL-MCNC: 12 MG/DL (ref 6–20)
BUN/CREAT SERPL: 14.6 (ref 7–25)
CALCIUM SPEC-SCNC: 8.9 MG/DL (ref 8.6–10.5)
CHLORIDE SERPL-SCNC: 101 MMOL/L (ref 98–107)
CO2 SERPL-SCNC: 23.4 MMOL/L (ref 22–29)
CREAT SERPL-MCNC: 0.82 MG/DL (ref 0.57–1)
DEPRECATED RDW RBC AUTO: 41.5 FL (ref 37–54)
EGFRCR SERPLBLD CKD-EPI 2021: 87.8 ML/MIN/1.73
EOSINOPHIL # BLD AUTO: 0.23 10*3/MM3 (ref 0–0.4)
EOSINOPHIL NFR BLD AUTO: 1.6 % (ref 0.3–6.2)
ERYTHROCYTE [DISTWIDTH] IN BLOOD BY AUTOMATED COUNT: 13.6 % (ref 12.3–15.4)
GEN 5 1HR TROPONIN T REFLEX: 20 NG/L
GLOBULIN UR ELPH-MCNC: 2.6 GM/DL
GLUCOSE SERPL-MCNC: 110 MG/DL (ref 65–99)
HCT VFR BLD AUTO: 43.7 % (ref 34–46.6)
HGB BLD-MCNC: 15.2 G/DL (ref 12–15.9)
HOLD SPECIMEN: NORMAL
HOLD SPECIMEN: NORMAL
IMM GRANULOCYTES # BLD AUTO: 0.06 10*3/MM3 (ref 0–0.05)
IMM GRANULOCYTES NFR BLD AUTO: 0.4 % (ref 0–0.5)
LYMPHOCYTES # BLD AUTO: 6.19 10*3/MM3 (ref 0.7–3.1)
LYMPHOCYTES NFR BLD AUTO: 43.3 % (ref 19.6–45.3)
MAGNESIUM SERPL-MCNC: 1.7 MG/DL (ref 1.6–2.6)
MCH RBC QN AUTO: 29.1 PG (ref 26.6–33)
MCHC RBC AUTO-ENTMCNC: 34.8 G/DL (ref 31.5–35.7)
MCV RBC AUTO: 83.7 FL (ref 79–97)
MONOCYTES # BLD AUTO: 1.33 10*3/MM3 (ref 0.1–0.9)
MONOCYTES NFR BLD AUTO: 9.3 % (ref 5–12)
NEUTROPHILS NFR BLD AUTO: 45.1 % (ref 42.7–76)
NEUTROPHILS NFR BLD AUTO: 6.45 10*3/MM3 (ref 1.7–7)
NRBC BLD AUTO-RTO: 0 /100 WBC (ref 0–0.2)
PLATELET # BLD AUTO: 235 10*3/MM3 (ref 140–450)
PMV BLD AUTO: 10.2 FL (ref 6–12)
POTASSIUM SERPL-SCNC: 2.9 MMOL/L (ref 3.5–5.2)
PROT SERPL-MCNC: 6.5 G/DL (ref 6–8.5)
RBC # BLD AUTO: 5.22 10*6/MM3 (ref 3.77–5.28)
RBC MORPH BLD: NORMAL
SMALL PLATELETS BLD QL SMEAR: ADEQUATE
SODIUM SERPL-SCNC: 135 MMOL/L (ref 136–145)
TROPONIN T % DELTA: 5
TROPONIN T NUMERIC DELTA: 1 NG/L
TROPONIN T SERPL HS-MCNC: 19 NG/L
WBC MORPH BLD: NORMAL
WBC NRBC COR # BLD AUTO: 14.3 10*3/MM3 (ref 3.4–10.8)
WHOLE BLOOD HOLD COAG: NORMAL
WHOLE BLOOD HOLD SPECIMEN: NORMAL

## 2025-04-17 PROCEDURE — 25510000001 IOPAMIDOL 61 % SOLUTION: Performed by: STUDENT IN AN ORGANIZED HEALTH CARE EDUCATION/TRAINING PROGRAM

## 2025-04-17 PROCEDURE — 96365 THER/PROPH/DIAG IV INF INIT: CPT

## 2025-04-17 PROCEDURE — 36415 COLL VENOUS BLD VENIPUNCTURE: CPT

## 2025-04-17 PROCEDURE — 25810000003 SODIUM CHLORIDE 0.9 % SOLUTION: Performed by: PHYSICIAN ASSISTANT

## 2025-04-17 PROCEDURE — 85025 COMPLETE CBC W/AUTO DIFF WBC: CPT

## 2025-04-17 PROCEDURE — 93005 ELECTROCARDIOGRAM TRACING: CPT | Performed by: STUDENT IN AN ORGANIZED HEALTH CARE EDUCATION/TRAINING PROGRAM

## 2025-04-17 PROCEDURE — 80053 COMPREHEN METABOLIC PANEL: CPT

## 2025-04-17 PROCEDURE — 99285 EMERGENCY DEPT VISIT HI MDM: CPT | Performed by: STUDENT IN AN ORGANIZED HEALTH CARE EDUCATION/TRAINING PROGRAM

## 2025-04-17 PROCEDURE — 93005 ELECTROCARDIOGRAM TRACING: CPT

## 2025-04-17 PROCEDURE — 25010000002 ONDANSETRON PER 1 MG: Performed by: PHYSICIAN ASSISTANT

## 2025-04-17 PROCEDURE — 96375 TX/PRO/DX INJ NEW DRUG ADDON: CPT

## 2025-04-17 PROCEDURE — 85007 BL SMEAR W/DIFF WBC COUNT: CPT

## 2025-04-17 PROCEDURE — 84484 ASSAY OF TROPONIN QUANT: CPT | Performed by: STUDENT IN AN ORGANIZED HEALTH CARE EDUCATION/TRAINING PROGRAM

## 2025-04-17 PROCEDURE — 71045 X-RAY EXAM CHEST 1 VIEW: CPT

## 2025-04-17 PROCEDURE — 84484 ASSAY OF TROPONIN QUANT: CPT

## 2025-04-17 PROCEDURE — 83735 ASSAY OF MAGNESIUM: CPT | Performed by: PHYSICIAN ASSISTANT

## 2025-04-17 PROCEDURE — 25010000002 POTASSIUM CHLORIDE 10 MEQ/100ML SOLUTION: Performed by: PHYSICIAN ASSISTANT

## 2025-04-17 PROCEDURE — 71275 CT ANGIOGRAPHY CHEST: CPT

## 2025-04-17 RX ORDER — FAMOTIDINE 10 MG/ML
20 INJECTION, SOLUTION INTRAVENOUS ONCE
Status: COMPLETED | OUTPATIENT
Start: 2025-04-17 | End: 2025-04-18

## 2025-04-17 RX ORDER — SODIUM CHLORIDE 0.9 % (FLUSH) 0.9 %
10 SYRINGE (ML) INJECTION AS NEEDED
Status: DISCONTINUED | OUTPATIENT
Start: 2025-04-17 | End: 2025-04-18 | Stop reason: HOSPADM

## 2025-04-17 RX ORDER — ONDANSETRON 2 MG/ML
4 INJECTION INTRAMUSCULAR; INTRAVENOUS ONCE
Status: COMPLETED | OUTPATIENT
Start: 2025-04-17 | End: 2025-04-17

## 2025-04-17 RX ORDER — ASPIRIN 325 MG
325 TABLET ORAL ONCE
Status: COMPLETED | OUTPATIENT
Start: 2025-04-17 | End: 2025-04-17

## 2025-04-17 RX ORDER — POTASSIUM CHLORIDE 7.45 MG/ML
10 INJECTION INTRAVENOUS ONCE
Status: COMPLETED | OUTPATIENT
Start: 2025-04-17 | End: 2025-04-17

## 2025-04-17 RX ORDER — IOPAMIDOL 612 MG/ML
100 INJECTION, SOLUTION INTRAVASCULAR
Status: COMPLETED | OUTPATIENT
Start: 2025-04-17 | End: 2025-04-17

## 2025-04-17 RX ORDER — POTASSIUM CHLORIDE 750 MG/1
40 CAPSULE, EXTENDED RELEASE ORAL ONCE
Status: COMPLETED | OUTPATIENT
Start: 2025-04-17 | End: 2025-04-17

## 2025-04-17 RX ADMIN — ONDANSETRON 4 MG: 2 INJECTION INTRAMUSCULAR; INTRAVENOUS at 21:36

## 2025-04-17 RX ADMIN — IOPAMIDOL 100 ML: 612 INJECTION, SOLUTION INTRAVENOUS at 22:04

## 2025-04-17 RX ADMIN — ASPIRIN 325 MG: 325 TABLET ORAL at 19:37

## 2025-04-17 RX ADMIN — POTASSIUM CHLORIDE 10 MEQ: 7.46 INJECTION, SOLUTION INTRAVENOUS at 21:48

## 2025-04-17 RX ADMIN — POTASSIUM CHLORIDE 40 MEQ: 750 CAPSULE, EXTENDED RELEASE ORAL at 21:36

## 2025-04-17 RX ADMIN — SODIUM CHLORIDE 500 ML: 9 INJECTION, SOLUTION INTRAVENOUS at 21:47

## 2025-04-18 VITALS
HEART RATE: 81 BPM | OXYGEN SATURATION: 99 % | WEIGHT: 240 LBS | RESPIRATION RATE: 18 BRPM | TEMPERATURE: 98.5 F | SYSTOLIC BLOOD PRESSURE: 183 MMHG | DIASTOLIC BLOOD PRESSURE: 125 MMHG | HEIGHT: 65 IN | BODY MASS INDEX: 39.99 KG/M2

## 2025-04-18 LAB
AMPHET+METHAMPHET UR QL: NEGATIVE
AMPHETAMINES UR QL: NEGATIVE
BARBITURATES UR QL SCN: NEGATIVE
BENZODIAZ UR QL SCN: NEGATIVE
BUPRENORPHINE SERPL-MCNC: NEGATIVE NG/ML
CANNABINOIDS SERPL QL: NEGATIVE
COCAINE UR QL: POSITIVE
FENTANYL UR-MCNC: POSITIVE NG/ML
METHADONE UR QL SCN: NEGATIVE
OPIATES UR QL: NEGATIVE
OXYCODONE UR QL SCN: NEGATIVE
PCP UR QL SCN: NEGATIVE
TRICYCLICS UR QL SCN: NEGATIVE

## 2025-04-18 PROCEDURE — 96375 TX/PRO/DX INJ NEW DRUG ADDON: CPT

## 2025-04-18 PROCEDURE — G0378 HOSPITAL OBSERVATION PER HR: HCPCS

## 2025-04-18 PROCEDURE — 25010000002 MORPHINE PER 10 MG: Performed by: STUDENT IN AN ORGANIZED HEALTH CARE EDUCATION/TRAINING PROGRAM

## 2025-04-18 PROCEDURE — 80307 DRUG TEST PRSMV CHEM ANLYZR: CPT | Performed by: PHYSICIAN ASSISTANT

## 2025-04-18 PROCEDURE — 25010000002 KETOROLAC TROMETHAMINE PER 15 MG: Performed by: INTERNAL MEDICINE

## 2025-04-18 PROCEDURE — 25010000002 FAMOTIDINE 10 MG/ML SOLUTION: Performed by: PHYSICIAN ASSISTANT

## 2025-04-18 PROCEDURE — 99222 1ST HOSP IP/OBS MODERATE 55: CPT | Performed by: INTERNAL MEDICINE

## 2025-04-18 RX ORDER — NICOTINE 21 MG/24HR
1 PATCH, TRANSDERMAL 24 HOURS TRANSDERMAL
Status: DISCONTINUED | OUTPATIENT
Start: 2025-04-18 | End: 2025-04-18 | Stop reason: HOSPADM

## 2025-04-18 RX ORDER — NITROGLYCERIN 0.4 MG/1
0.4 TABLET SUBLINGUAL
Status: DISCONTINUED | OUTPATIENT
Start: 2025-04-18 | End: 2025-04-18 | Stop reason: HOSPADM

## 2025-04-18 RX ORDER — KETOROLAC TROMETHAMINE 30 MG/ML
15 INJECTION, SOLUTION INTRAMUSCULAR; INTRAVENOUS EVERY 6 HOURS PRN
Status: DISCONTINUED | OUTPATIENT
Start: 2025-04-18 | End: 2025-04-18 | Stop reason: HOSPADM

## 2025-04-18 RX ADMIN — FAMOTIDINE 20 MG: 10 INJECTION, SOLUTION INTRAVENOUS at 00:04

## 2025-04-18 RX ADMIN — NITROGLYCERIN 1 INCH: 20 OINTMENT TOPICAL at 00:03

## 2025-04-18 RX ADMIN — NICOTINE 1 PATCH: 21 PATCH TRANSDERMAL at 01:26

## 2025-04-18 RX ADMIN — KETOROLAC TROMETHAMINE 15 MG: 30 INJECTION, SOLUTION INTRAMUSCULAR; INTRAVENOUS at 02:10

## 2025-04-18 RX ADMIN — MORPHINE SULFATE 4 MG: 4 INJECTION, SOLUTION INTRAMUSCULAR; INTRAVENOUS at 00:04

## 2025-04-18 NOTE — H&P
[facility]   HISTORY AND PHYSICAL      Name:  Vashti Arredondo   Age:  49 y.o.  Sex:  female  :  1975  MRN:  [id number]   Visit Number:  [id number]  Admission Date:  2025  Date Of Service:  25  Primary Care Physician:  Jazmine Faustin APRN    Chief Complaint:     Chest pain    History Of Presenting Illness:      Patient is a chronically ill 49-year-old female with history significant for type 2 diabetes, GERD and hepatitis C who presented to the emergency room with complaints of acute chest pain.  Patient describes the pain as midsternal and sharp.  Received nitroglycerin and aspirin via EMS en route to the ER.  Chest pain continued.  Workup in the emergency room revealing UDS positive for cocaine and fentanyl.  Patient advised on discontinuation of illicit drug use as this is likely contributing to chest pain.  Troponins minimally elevated at 19-20.  EKG without acute ischemic changes.  Advised admission for overnight observation.  Patient ultimately decided to sign out AGAINST MEDICAL ADVICE.    Review Of Systems:    All systems were reviewed and negative except as mentioned in history of presenting illness, assessment and plan.    Past Medical History: Patient  has a past medical history of Heard esophagus, Chronic pain, Diabetes mellitus, Fibromyalgia, Fracture of coccyx, Heartburn, Hepatitis C, History of sinus bradycardia, RONQUILLO (nonalcoholic steatohepatitis), Obesity, Osteoporosis, Reflex sympathetic dystrophy, and Rheumatoid factor positive.    Past Surgical History: Patient  has a past surgical history that includes Cardiac catheterization; Hysterectomy; Cholecystectomy; Back surgery; Foot surgery; Dilation and curettage of uterus; Interventional radiology procedure (N/A, 2018); Liver biopsy; Upper gastrointestinal endoscopy; and US Guided Fine Needle Aspiration (2016).    Social History: Patient  reports that she has been smoking cigarettes. She has  never used smokeless tobacco. She reports that she does not drink alcohol and does not use drugs.    Family History:  Patient's family history has been reviewed and found to be noncontributory.     Allergies:      Lyrica [pregabalin] and Reglan [metoclopramide]    Home Medications:    Prior to Admission Medications       Prescriptions Last Dose Informant Patient Reported? Taking?    amLODIPine (NORVASC) 5 MG tablet   Yes No    TK 1 T PO QD    carisoprodol (SOMA) 350 MG tablet   Yes No    TK 1 T PO QID FOR 10 DAYS PRN P    cetirizine (zyrTEC) 10 MG tablet   Yes No    TAKE 1 TABLET BY MOUTH EVERY DAY FOR ALLERGIES    Cholecalciferol (VITAMIN D3) 5000 UNITS capsule capsule   Yes No    Take 5,000 Units by mouth 1 (one) time per week.    diclofenac (VOLTAREN) 50 MG EC tablet   Yes No    TK 1 T PO TID FOR 10 DAYS    furosemide (LASIX) 40 MG tablet   Yes No    Take 1 tablet by mouth As Needed.    lisinopril (PRINIVIL,ZESTRIL) 40 MG tablet   Yes No    Take 1 tablet by mouth Daily.    metFORMIN (GLUCOPHAGE) 1000 MG tablet   Yes No    TK 1 T PO BID    potassium chloride (KLOR-CON) 20 MEQ packet   Yes No    Take 20 mEq by mouth As Needed.    potassium chloride 10 MEQ CR tablet   Yes No    Take 2 tablets by mouth Daily.    pravastatin (PRAVACHOL) 10 MG tablet   Yes No    Take 1 tablet by mouth Every Night.    Premarin 0.625 MG tablet   Yes No    Take 1 tablet by mouth Daily.    ribavirin (REBETOL) 200 MG capsule   Yes No    Take 200 mg by mouth 2 (two) times a day.          ED Medications:    Medications   sodium chloride 0.9 % flush 10 mL (has no administration in time range)   nicotine (NICODERM CQ) 21 MG/24HR patch 1 patch (1 patch Transdermal Medication Applied 4/18/25 0126)   ketorolac (TORADOL) injection 15 mg (15 mg Intravenous Given 4/18/25 0210)   nitroglycerin (NITROSTAT) SL tablet 0.4 mg (has no administration in time range)   aspirin tablet 325 mg (325 mg Oral Given 4/17/25 1937)   potassium chloride  "(MICRO-K/KLOR-CON) CR capsule (40 mEq Oral Given 4/17/25 2136)   potassium chloride 10 mEq in 100 mL IVPB (0 mEq Intravenous Stopped 4/17/25 2304)   sodium chloride 0.9 % bolus 500 mL (0 mL Intravenous Stopped 4/17/25 2304)   ondansetron (ZOFRAN) injection 4 mg (4 mg Intravenous Given 4/17/25 2136)   iopamidol (ISOVUE-300) 61 % injection 100 mL (100 mL Intravenous Given 4/17/25 2204)   nitroglycerin (NITROSTAT) ointment 1 inch (1 inch Topical Given 4/18/25 0003)   morphine injection 4 mg (4 mg Intravenous Given 4/18/25 0004)   famotidine (PEPCID) injection 20 mg (20 mg Intravenous Given 4/18/25 0004)     Vital Signs:  Temp:  [98.5 °F (36.9 °C)] 98.5 °F (36.9 °C)  Heart Rate:  [] 80  Resp:  [18] 18  BP: (114-182)/() 182/124        04/17/25 1931   Weight: 109 kg (240 lb)     Body mass index is 39.94 kg/m².    Physical Exam:     Most recent vital Signs: BP (!) 182/124   Pulse 80   Temp 98.5 °F (36.9 °C) (Oral)   Resp 18   Ht 165.1 cm (65\")   Wt 109 kg (240 lb)   SpO2 99%   BMI 39.94 kg/m²     Physical Exam  Vitals reviewed.   Constitutional:       General: She is not in acute distress.     Appearance: She is obese.   HENT:      Head: Normocephalic and atraumatic.      Right Ear: External ear normal.      Left Ear: External ear normal.      Mouth/Throat:      Mouth: Mucous membranes are moist.      Pharynx: Oropharynx is clear.   Eyes:      Extraocular Movements: Extraocular movements intact.      Conjunctiva/sclera: Conjunctivae normal.   Cardiovascular:      Rate and Rhythm: Normal rate and regular rhythm.      Pulses: Normal pulses.      Heart sounds: Normal heart sounds.   Pulmonary:      Effort: Pulmonary effort is normal.      Breath sounds: Normal breath sounds.   Abdominal:      General: Bowel sounds are normal.      Palpations: Abdomen is soft.   Musculoskeletal:         General: Normal range of motion.   Skin:     General: Skin is warm and dry.   Neurological:      General: No focal deficit " "present.      Mental Status: She is alert and oriented to person, place, and time.         Laboratory data:    I have reviewed the labs done in the emergency room.    Results from last 7 days   Lab Units 04/17/25 1933   SODIUM mmol/L 135*   POTASSIUM mmol/L 2.9*   CHLORIDE mmol/L 101   CO2 mmol/L 23.4   BUN mg/dL 12   CREATININE mg/dL 0.82   CALCIUM mg/dL 8.9   BILIRUBIN mg/dL 0.4   ALK PHOS U/L 73   ALT (SGPT) U/L 15   AST (SGOT) U/L 14   GLUCOSE mg/dL 110*     Results from last 7 days   Lab Units 04/17/25 1933   WBC 10*3/mm3 14.30*   HEMOGLOBIN g/dL 15.2   HEMATOCRIT % 43.7   PLATELETS 10*3/mm3 235         Results from last 7 days   Lab Units 04/17/25 2049 04/17/25 1933   HSTROP T ng/L 20* 19*                           Invalid input(s): \"USDES\", \"NITRITITE\", \"BACT\", \"EP\"    Pain Management Panel  More data may exist         Latest Ref Rng & Units 4/18/2025 9/2/2024   Pain Management Panel   Amphetamine, Urine Qual Negative Negative  Negative    Barbiturates Screen, Urine Negative Negative  Negative    Benzodiazepine Screen, Urine Negative Negative  Positive    Buprenorphine, Screen, Urine Negative Negative  Negative    Cocaine Screen, Urine Negative Positive  Negative    Fentanyl, Urine Negative Positive  Positive    Methadone Screen , Urine Negative Negative  Negative    Methamphetamine, Ur Negative Negative  Negative        EKG:      EKG personally reviewed, sinus tachycardia with rate of 102 bpm.  No acute ischemic changes.    Radiology:    CT Angiogram Chest  Result Date: 4/17/2025  FINAL REPORT TECHNIQUE: null CLINICAL HISTORY: chest pain, SOA COMPARISON: null FINDINGS: CT angiography chest with contrast. 3D Postprocessing. Comparison: None Findings: The pulmonary arteries are well opacified. Mild plaque in the thoracic aorta without aneurysm or dissection. The heart is not enlarged. There is no pericardial effusion. No enlarged mediastinal or hilar lymph nodes. The thyroid is unremarkable. Minimal " paraseptal emphysematous changes in the lung apices. No focal airspace consolidation, pleural effusion, or pneumothorax. No worrisome pulmonary masses or nodules. Series cirrhotic liver morphology. Prior cholecystectomy. No acute fractures.     IMPRESSION: 1. No pulmonary emboli. No acute parenchymal lung disease. 2. Minimal paraseptal emphysematous changes in the lung apices. 3. Cirrhotic liver morphology. Authenticated and Electronically Signed by Paul Beltre MD on 04/17/2025 10:47:57 PM      Assessment:    Chest pain  Illicit drug abuse    Plan:    Patient left AGAINST MEDICAL ADVICE.  She was counseled on drug cessation and educated on risk of leaving AMA including worsening condition and death.        Vasile Boles DO  04/18/25  02:15 EDT    Dictated utilizing Dragon dictation.

## 2025-04-18 NOTE — ED PROVIDER NOTES
Subjective:  Chief Complaint:  Chest pain    History of Present Illness:  Patient is a 49-year-old female with history of diabetes, fibromyalgia, hepatitis C, RONQUILLO, osteoporosis, among others presenting to the ER with complaints of chest pain radiating to her back as well as dyspnea on exertion.  States that the pain feels like a pressure/heaviness.  Patient states she has a cardiac history of Takotsubo and had a heart cath in 2011.  States she has not followed with cardiology regularly since then.  States she has swelling to her legs.  Appears she did have a echo in 2024 that showed normal EF.  Patient states she has strong family history of cardiac disease, multiple family members with heart attacks and heart disease.  She states that her pain is 8 out of 10 in severity.  States it has not been relieved by aspirin and nitroglycerin given by EMS.  She evidently received another dose of aspirin here in the ER ordered via triage protocol.  Patient has nausea and vomiting as well.  States that she is not on any anticoagulants or diuretics.  Denies additional symptoms or complaints at this time.      Nurses Notes reviewed and agree, including vitals, allergies, social history and prior medical history.     REVIEW OF SYSTEMS: All systems reviewed and not pertinent unless noted.  Review of Systems   Respiratory:  Positive for shortness of breath.    Cardiovascular:  Positive for chest pain.   Gastrointestinal:  Positive for nausea and vomiting.   All other systems reviewed and are negative.      Past Medical History:   Diagnosis Date    Heard esophagus     Chronic pain     Diabetes mellitus     Fibromyalgia     Fracture of coccyx     Heartburn     Hepatitis C     History of sinus bradycardia     RONQUILLO (nonalcoholic steatohepatitis)     Obesity     Osteoporosis     Reflex sympathetic dystrophy     Rheumatoid factor positive        Allergies:    Lyrica [pregabalin] and Reglan [metoclopramide]      Past Surgical History:  "  Procedure Laterality Date    BACK SURGERY      CARDIAC CATHETERIZATION      CHOLECYSTECTOMY      DILATATION AND CURETTAGE      FOOT SURGERY      HYSTERECTOMY      INTERVENTIONAL RADIOLOGY PROCEDURE N/A 2/19/2018    Procedure: myelogram lumbar spine;  Surgeon: Octaviano Breen MD;  Location: [facility] INVASIVE LOCATION;  Service:     LIVER BIOPSY      UPPER GASTROINTESTINAL ENDOSCOPY      diagnostic    US GUIDED FINE NEEDLE ASPIRATION  5/9/2016         Social History     Socioeconomic History    Marital status:    Tobacco Use    Smoking status: Every Day     Current packs/day: 1.00     Types: Cigarettes    Smokeless tobacco: Never   Vaping Use    Vaping status: Never Used   Substance and Sexual Activity    Alcohol use: No    Drug use: No    Sexual activity: Defer         Family History   Problem Relation Age of Onset    COPD Mother     Asthma Mother     Heart disease Father        Objective  Physical Exam:  BP (!) 133/102   Pulse 78   Temp 98.5 °F (36.9 °C) (Oral)   Resp 18   Ht 165.1 cm (65\")   Wt 109 kg (240 lb)   SpO2 99%   BMI 39.94 kg/m²      Physical Exam  Vitals and nursing note reviewed.   Constitutional:       General: She is not in acute distress.     Appearance: She is not toxic-appearing.   HENT:      Head: Normocephalic and atraumatic.   Eyes:      Extraocular Movements: Extraocular movements intact.   Cardiovascular:      Rate and Rhythm: Normal rate.      Heart sounds: Normal heart sounds.   Pulmonary:      Effort: Pulmonary effort is normal. No tachypnea.      Breath sounds: Normal breath sounds.   Abdominal:      Palpations: Abdomen is soft.      Tenderness: There is no abdominal tenderness.   Musculoskeletal:         General: Normal range of motion.      Cervical back: Normal range of motion and neck supple.   Skin:     General: Skin is warm and dry.   Neurological:      General: No focal deficit present.      Mental Status: She is alert and oriented to person, place, and time. "   Psychiatric:         Mood and Affect: Mood normal.         Behavior: Behavior normal.         Procedures    ED Course:         Lab Results (last 24 hours)       Procedure Component Value Units Date/Time    CBC & Differential [[id number]]  (Abnormal) Collected: 04/17/25 1933    Specimen: Blood Updated: 04/17/25 2000    Narrative:      The following orders were created for panel order CBC & Differential.  Procedure                               Abnormality         Status                     ---------                               -----------         ------                     CBC Auto Differential[[id number]]        Abnormal            Final result               Scan Slide[[id number]]                                       Final result                 Please view results for these tests on the individual orders.    Comprehensive Metabolic Panel [[id number]]  (Abnormal) Collected: 04/17/25 1933    Specimen: Blood Updated: 04/17/25 1959     Glucose 110 mg/dL      BUN 12 mg/dL      Creatinine 0.82 mg/dL      Sodium 135 mmol/L      Potassium 2.9 mmol/L      Chloride 101 mmol/L      CO2 23.4 mmol/L      Calcium 8.9 mg/dL      Total Protein 6.5 g/dL      Albumin 3.9 g/dL      ALT (SGPT) 15 U/L      AST (SGOT) 14 U/L      Alkaline Phosphatase 73 U/L      Total Bilirubin 0.4 mg/dL      Globulin 2.6 gm/dL      A/G Ratio 1.5 g/dL      BUN/Creatinine Ratio 14.6     Anion Gap 10.6 mmol/L      eGFR 87.8 mL/min/1.73     Narrative:      GFR Categories in Chronic Kidney Disease (CKD)      GFR Category          GFR (mL/min/1.73)    Interpretation  G1                     90 or greater         Normal or high (1)  G2                      60-89                Mild decrease (1)  G3a                   45-59                Mild to moderate decrease  G3b                   30-44                Moderate to severe decrease  G4                    15-29                Severe decrease  G5                    14 or less           Kidney  failure          (1)In the absence of evidence of kidney disease, neither GFR category G1 or G2 fulfill the criteria for CKD.    eGFR calculation 2021 CKD-EPI creatinine equation, which does not include race as a factor    High Sensitivity Troponin T [[id number]]  (Abnormal) Collected: 04/17/25 1933    Specimen: Blood Updated: 04/17/25 2002     HS Troponin T 19 ng/L     Narrative:      High Sensitive Troponin T Reference Range:  <14.0 ng/L- Negative Female for AMI  <22.0 ng/L- Negative Male for AMI  >=14 - Abnormal Female indicating possible myocardial injury.  >=22 - Abnormal Male indicating possible myocardial injury.   Clinicians would have to utilize clinical acumen, EKG, Troponin, and serial changes to determine if it is an Acute Myocardial Infarction or myocardial injury due to an underlying chronic condition.         CBC Auto Differential [[id number]]  (Abnormal) Collected: 04/17/25 1933    Specimen: Blood Updated: 04/17/25 1959     WBC 14.30 10*3/mm3      RBC 5.22 10*6/mm3      Hemoglobin 15.2 g/dL      Hematocrit 43.7 %      MCV 83.7 fL      MCH 29.1 pg      MCHC 34.8 g/dL      RDW 13.6 %      RDW-SD 41.5 fl      MPV 10.2 fL      Platelets 235 10*3/mm3      Neutrophil % 45.1 %      Lymphocyte % 43.3 %      Monocyte % 9.3 %      Eosinophil % 1.6 %      Basophil % 0.3 %      Immature Grans % 0.4 %      Neutrophils, Absolute 6.45 10*3/mm3      Lymphocytes, Absolute 6.19 10*3/mm3      Monocytes, Absolute 1.33 10*3/mm3      Eosinophils, Absolute 0.23 10*3/mm3      Basophils, Absolute 0.04 10*3/mm3      Immature Grans, Absolute 0.06 10*3/mm3      nRBC 0.0 /100 WBC     Scan Slide [[id number]] Collected: 04/17/25 1933    Specimen: Blood Updated: 04/17/25 2000     RBC Morphology Normal     WBC Morphology Normal     Platelet Estimate Adequate    High Sensitivity Troponin T 1Hr [[id number]]  (Abnormal) Collected: 04/17/25 2049    Specimen: Blood Updated: 04/17/25 2116     HS Troponin T 20 ng/L      Troponin T Numeric  Delta 1 ng/L      Troponin T % Delta 5    Narrative:      High Sensitive Troponin T Reference Range:  <14.0 ng/L- Negative Female for AMI  <22.0 ng/L- Negative Male for AMI  >=14 - Abnormal Female indicating possible myocardial injury.  >=22 - Abnormal Male indicating possible myocardial injury.   Clinicians would have to utilize clinical acumen, EKG, Troponin, and serial changes to determine if it is an Acute Myocardial Infarction or myocardial injury due to an underlying chronic condition.         Magnesium [[id number]]  (Normal) Collected: 04/17/25 2049    Specimen: Blood Updated: 04/17/25 2126     Magnesium 1.7 mg/dL              CT Angiogram Chest  Result Date: 4/17/2025  FINAL REPORT TECHNIQUE: null CLINICAL HISTORY: chest pain, SOA COMPARISON: null FINDINGS: CT angiography chest with contrast. 3D Postprocessing. Comparison: None Findings: The pulmonary arteries are well opacified. Mild plaque in the thoracic aorta without aneurysm or dissection. The heart is not enlarged. There is no pericardial effusion. No enlarged mediastinal or hilar lymph nodes. The thyroid is unremarkable. Minimal paraseptal emphysematous changes in the lung apices. No focal airspace consolidation, pleural effusion, or pneumothorax. No worrisome pulmonary masses or nodules. Series cirrhotic liver morphology. Prior cholecystectomy. No acute fractures.     Impression: IMPRESSION: 1. No pulmonary emboli. No acute parenchymal lung disease. 2. Minimal paraseptal emphysematous changes in the lung apices. 3. Cirrhotic liver morphology. Authenticated and Electronically Signed by Paul Beltre MD on 04/17/2025 10:47:57 PM         MDM     Amount and/or Complexity of Data Reviewed  Decide to obtain previous medical records or to obtain history from someone other than the patient: yes    Patient evaluated in the ER for chest pain, nausea, vomiting, shortness of breath.  She is mildly hypertensive but otherwise hemodynamically stable upon initial  evaluation.  She does complain of 8 out of 10 chest pain not relieved by aspirin or nitroglycerin.  She has history of Takotsubo cardiomyopathy, last heart cath was 14 years ago.  She has not followed with cardiology regularly.  Does not recall having a stress test in the past.  Differential diagnosis includes but is not limited to ACS, cardiac arrhythmia, dehydration, electrolyte abnormalities, PE, aortic dissection, among others.  Initial plan includes CBC, CMP, troponins, magnesium, CTA chest, EKG.    CTA shows no PE, no acute parenchymal lung disease, minimal emphysematous changes, cirrhotic liver.  Troponins did uptrend from 19-20.  Potassium low at 2.9.  Ordered oral and IV potassium replacement.  Patient also received Zofran and aspirin in the ER as well as IV fluids.  On reevaluation, patient reports worsening pain, no improvement with medications.  Ordered nitroglycerin ointment and morphine and Pepcid.  Given ongoing chest pain with HEART score of 5 discussed with Dr. Boles, hospitalist, who has accepted the patient for admission for further evaluation and management.    Final diagnoses:   Chest pain, unspecified type   Nausea and vomiting, unspecified vomiting type   Hypokalemia          Kimi Lopes PA-C  04/17/25 [id number]

## 2025-04-18 NOTE — DISCHARGE SUMMARY
Patient left AMA.  She was counseled on risk of leaving the hospital AGAINST MEDICAL ADVICE to include worsening chest pain, cardiac condition as well as death.  She was advised to discontinue illicit drug use.

## 2025-04-18 NOTE — PAYOR COMM NOTE
"To:  [facility]  From: Baylee Liu RN  Phone: [phone]  Fax: [phone]  NPI: [id number]  TIN: [id number]  Member ID: [id number]   MRN: [id number]    Vashti Arredondo (49 y.o. Female)       Date of Birth   1975    Social Security Number       Address   [address] [place] [address] [place], [place] [address]    Home Phone   [phone]    MRN   [id number]       Gnosticism   Scientologist    Marital Status                               Admission Date   2025    Admission Type   Emergency    Admitting Provider       Attending Provider       Department, Room/Bed   [facility] EMERGENCY DEPARTMENT,        Discharge Date   2025    Discharge Disposition   Left Against Medical Advice    Discharge Destination   Home                              Attending Provider: (none)   Allergies: Lyrica [Pregabalin], Reglan [Metoclopramide]    Isolation: None   Infection: None   Code Status: Prior    Ht: 165.1 cm (65\")   Wt: 109 kg (240 lb)    Admission Cmt: None   Principal Problem: Chest pain [R07.9]                   Active Insurance as of 2025       Primary Coverage       Payor Plan Insurance Group Employer/Plan Group    [employer] [place] [employer]        Payor Plan Address Payor Plan Phone Number Payor Plan Fax Number Effective Dates    [address] [address] [phone]  2016 - None Entered    [facility] [id number]         Subscriber Name Subscriber Birth Date Member ID       VASHTI ARREDONDO 1975 [id number]                     Emergency Contacts        (Rel.) Home Phone Work Phone Mobile Phone    Lucas Arredondo (Daughter) -- -- [phone]                 History & Physical        Vasile Boles DO at 25 0214            [facility] HOSPITALIST   HISTORY AND PHYSICAL      Name:  Vashti Arredondo   Age:  49 y.o.  Sex:  female  :  1975  MRN:  [id number]   Visit Number:  [id number]  Admission Date:  2025  Date Of Service:  " 04/18/25  Primary Care Physician:  Jazmine Faustin APRN    Chief Complaint:     Chest pain    History Of Presenting Illness:      Patient is a chronically ill 49-year-old female with history significant for type 2 diabetes, GERD and hepatitis C who presented to the emergency room with complaints of acute chest pain.  Patient describes the pain as midsternal and sharp.  Received nitroglycerin and aspirin via EMS en route to the ER.  Chest pain continued.  Workup in the emergency room revealing UDS positive for cocaine and fentanyl.  Patient advised on discontinuation of illicit drug use as this is likely contributing to chest pain.  Troponins minimally elevated at 19-20.  EKG without acute ischemic changes.  Advised admission for overnight observation.  Patient ultimately decided to sign out AGAINST MEDICAL ADVICE.    Review Of Systems:    All systems were reviewed and negative except as mentioned in history of presenting illness, assessment and plan.    Past Medical History: Patient  has a past medical history of Heard esophagus, Chronic pain, Diabetes mellitus, Fibromyalgia, Fracture of coccyx, Heartburn, Hepatitis C, History of sinus bradycardia, RONQUILLO (nonalcoholic steatohepatitis), Obesity, Osteoporosis, Reflex sympathetic dystrophy, and Rheumatoid factor positive.    Past Surgical History: Patient  has a past surgical history that includes Cardiac catheterization; Hysterectomy; Cholecystectomy; Back surgery; Foot surgery; Dilation and curettage of uterus; Interventional radiology procedure (N/A, 2/19/2018); Liver biopsy; Upper gastrointestinal endoscopy; and US Guided Fine Needle Aspiration (5/9/2016).    Social History: Patient  reports that she has been smoking cigarettes. She has never used smokeless tobacco. She reports that she does not drink alcohol and does not use drugs.    Family History:  Patient's family history has been reviewed and found to be noncontributory.     Allergies:      Lyrica  [pregabalin] and Reglan [metoclopramide]    Home Medications:    Prior to Admission Medications       Prescriptions Last Dose Informant Patient Reported? Taking?    amLODIPine (NORVASC) 5 MG tablet   Yes No    TK 1 T PO QD    carisoprodol (SOMA) 350 MG tablet   Yes No    TK 1 T PO QID FOR 10 DAYS PRN P    cetirizine (zyrTEC) 10 MG tablet   Yes No    TAKE 1 TABLET BY MOUTH EVERY DAY FOR ALLERGIES    Cholecalciferol (VITAMIN D3) 5000 UNITS capsule capsule   Yes No    Take 5,000 Units by mouth 1 (one) time per week.    diclofenac (VOLTAREN) 50 MG EC tablet   Yes No    TK 1 T PO TID FOR 10 DAYS    furosemide (LASIX) 40 MG tablet   Yes No    Take 1 tablet by mouth As Needed.    lisinopril (PRINIVIL,ZESTRIL) 40 MG tablet   Yes No    Take 1 tablet by mouth Daily.    metFORMIN (GLUCOPHAGE) 1000 MG tablet   Yes No    TK 1 T PO BID    potassium chloride (KLOR-CON) 20 MEQ packet   Yes No    Take 20 mEq by mouth As Needed.    potassium chloride 10 MEQ CR tablet   Yes No    Take 2 tablets by mouth Daily.    pravastatin (PRAVACHOL) 10 MG tablet   Yes No    Take 1 tablet by mouth Every Night.    Premarin 0.625 MG tablet   Yes No    Take 1 tablet by mouth Daily.    ribavirin (REBETOL) 200 MG capsule   Yes No    Take 200 mg by mouth 2 (two) times a day.          ED Medications:    Medications   sodium chloride 0.9 % flush 10 mL (has no administration in time range)   nicotine (NICODERM CQ) 21 MG/24HR patch 1 patch (1 patch Transdermal Medication Applied 4/18/25 0126)   ketorolac (TORADOL) injection 15 mg (15 mg Intravenous Given 4/18/25 0210)   nitroglycerin (NITROSTAT) SL tablet 0.4 mg (has no administration in time range)   aspirin tablet 325 mg (325 mg Oral Given 4/17/25 1937)   potassium chloride (MICRO-K/KLOR-CON) CR capsule (40 mEq Oral Given 4/17/25 2136)   potassium chloride 10 mEq in 100 mL IVPB (0 mEq Intravenous Stopped 4/17/25 2304)   sodium chloride 0.9 % bolus 500 mL (0 mL Intravenous Stopped 4/17/25 2304)  "  ondansetron (ZOFRAN) injection 4 mg (4 mg Intravenous Given 4/17/25 2136)   iopamidol (ISOVUE-300) 61 % injection 100 mL (100 mL Intravenous Given 4/17/25 2204)   nitroglycerin (NITROSTAT) ointment 1 inch (1 inch Topical Given 4/18/25 0003)   morphine injection 4 mg (4 mg Intravenous Given 4/18/25 0004)   famotidine (PEPCID) injection 20 mg (20 mg Intravenous Given 4/18/25 0004)     Vital Signs:  Temp:  [98.5 °F (36.9 °C)] 98.5 °F (36.9 °C)  Heart Rate:  [] 80  Resp:  [18] 18  BP: (114-182)/() 182/124        04/17/25 1931   Weight: 109 kg (240 lb)     Body mass index is 39.94 kg/m².    Physical Exam:     Most recent vital Signs: BP (!) 182/124   Pulse 80   Temp 98.5 °F (36.9 °C) (Oral)   Resp 18   Ht 165.1 cm (65\")   Wt 109 kg (240 lb)   SpO2 99%   BMI 39.94 kg/m²     Physical Exam  Vitals reviewed.   Constitutional:       General: She is not in acute distress.     Appearance: She is obese.   HENT:      Head: Normocephalic and atraumatic.      Right Ear: External ear normal.      Left Ear: External ear normal.      Mouth/Throat:      Mouth: Mucous membranes are moist.      Pharynx: Oropharynx is clear.   Eyes:      Extraocular Movements: Extraocular movements intact.      Conjunctiva/sclera: Conjunctivae normal.   Cardiovascular:      Rate and Rhythm: Normal rate and regular rhythm.      Pulses: Normal pulses.      Heart sounds: Normal heart sounds.   Pulmonary:      Effort: Pulmonary effort is normal.      Breath sounds: Normal breath sounds.   Abdominal:      General: Bowel sounds are normal.      Palpations: Abdomen is soft.   Musculoskeletal:         General: Normal range of motion.   Skin:     General: Skin is warm and dry.   Neurological:      General: No focal deficit present.      Mental Status: She is alert and oriented to person, place, and time.         Laboratory data:    I have reviewed the labs done in the emergency room.    Results from last 7 days   Lab Units 04/17/25 1933 " "  SODIUM mmol/L 135*   POTASSIUM mmol/L 2.9*   CHLORIDE mmol/L 101   CO2 mmol/L 23.4   BUN mg/dL 12   CREATININE mg/dL 0.82   CALCIUM mg/dL 8.9   BILIRUBIN mg/dL 0.4   ALK PHOS U/L 73   ALT (SGPT) U/L 15   AST (SGOT) U/L 14   GLUCOSE mg/dL 110*     Results from last 7 days   Lab Units 04/17/25  1933   WBC 10*3/mm3 14.30*   HEMOGLOBIN g/dL 15.2   HEMATOCRIT % 43.7   PLATELETS 10*3/mm3 235         Results from last 7 days   Lab Units 04/17/25 2049 04/17/25  1933   HSTROP T ng/L 20* 19*                           Invalid input(s): \"USDES\", \"NITRITITE\", \"BACT\", \"EP\"    Pain Management Panel  More data may exist         Latest Ref Rng & Units 4/18/2025 9/2/2024   Pain Management Panel   Amphetamine, Urine Qual Negative Negative  Negative    Barbiturates Screen, Urine Negative Negative  Negative    Benzodiazepine Screen, Urine Negative Negative  Positive    Buprenorphine, Screen, Urine Negative Negative  Negative    Cocaine Screen, Urine Negative Positive  Negative    Fentanyl, Urine Negative Positive  Positive    Methadone Screen , Urine Negative Negative  Negative    Methamphetamine, Ur Negative Negative  Negative        EKG:      EKG personally reviewed, sinus tachycardia with rate of 102 bpm.  No acute ischemic changes.    Radiology:    CT Angiogram Chest  Result Date: 4/17/2025  FINAL REPORT TECHNIQUE: null CLINICAL HISTORY: chest pain, SOA COMPARISON: null FINDINGS: CT angiography chest with contrast. 3D Postprocessing. Comparison: None Findings: The pulmonary arteries are well opacified. Mild plaque in the thoracic aorta without aneurysm or dissection. The heart is not enlarged. There is no pericardial effusion. No enlarged mediastinal or hilar lymph nodes. The thyroid is unremarkable. Minimal paraseptal emphysematous changes in the lung apices. No focal airspace consolidation, pleural effusion, or pneumothorax. No worrisome pulmonary masses or nodules. Series cirrhotic liver morphology. Prior cholecystectomy. No " acute fractures.     IMPRESSION: 1. No pulmonary emboli. No acute parenchymal lung disease. 2. Minimal paraseptal emphysematous changes in the lung apices. 3. Cirrhotic liver morphology. Authenticated and Electronically Signed by Paul Beltre MD on 04/17/2025 10:47:57 PM      Assessment:    Chest pain  Illicit drug abuse    Plan:    Patient left AGAINST MEDICAL ADVICE.  She was counseled on drug cessation and educated on risk of leaving AMA including worsening condition and death.        Vasile Boles DO  04/18/25  02:15 EDT    Dictated utilizing Dragon dictation.      Electronically signed by Vasile Boles DO at 04/18/25 0218       Vital Signs (last day) before discharge       Date/Time Temp Temp src Pulse Resp BP Patient Position SpO2    04/18/25 0137 -- -- 81 -- 183/125 -- 99    04/18/25 0107 -- -- 80 18 182/124 -- 99    04/18/25 0008 -- -- 81 -- 183/117 -- 99    04/17/25 2257 -- -- 77 -- 153/105 -- 100    04/17/25 2230 -- -- 78 -- -- -- 99    04/17/25 2227 -- -- 80 -- 133/102 -- 100    04/17/25 2025 -- -- 93 -- 125/86 -- --    04/17/25 1955 -- -- 99 -- 114/88 -- --    04/17/25 1931 -- -- -- -- 146/85 -- --    04/17/25 1928 98.5 (36.9) Oral 101 18 -- Sitting 96    04/17/25 1925 -- -- 103 -- 146/85 -- 96          No current facility-administered medications for this encounter.     Current Outpatient Medications   Medication Sig Dispense Refill    amLODIPine (NORVASC) 5 MG tablet TK 1 T PO QD  5    carisoprodol (SOMA) 350 MG tablet TK 1 T PO QID FOR 10 DAYS PRN P  0    cetirizine (zyrTEC) 10 MG tablet TAKE 1 TABLET BY MOUTH EVERY DAY FOR ALLERGIES      Cholecalciferol (VITAMIN D3) 5000 UNITS capsule capsule Take 5,000 Units by mouth 1 (one) time per week.      diclofenac (VOLTAREN) 50 MG EC tablet TK 1 T PO TID FOR 10 DAYS  0    furosemide (LASIX) 40 MG tablet Take 1 tablet by mouth As Needed.      lisinopril (PRINIVIL,ZESTRIL) 40 MG tablet Take 1 tablet by mouth Daily.      metFORMIN (GLUCOPHAGE) 1000 MG  tablet TK 1 T PO BID  2    potassium chloride (KLOR-CON) 20 MEQ packet Take 20 mEq by mouth As Needed.      potassium chloride 10 MEQ CR tablet Take 2 tablets by mouth Daily.      pravastatin (PRAVACHOL) 10 MG tablet Take 1 tablet by mouth Every Night.      Premarin 0.625 MG tablet Take 1 tablet by mouth Daily.      ribavirin (REBETOL) 200 MG capsule Take 200 mg by mouth 2 (two) times a day.       Lab Results (last 24 hours)       Procedure Component Value Units Date/Time    Fentanyl, Urine - Urine, Clean Catch [[id number]]  (Abnormal) Collected: 04/18/25 0002    Specimen: Urine, Clean Catch Updated: 04/18/25 0022     Fentanyl, Urine Positive    Narrative:      Negative Threshold:      Fentanyl 5 ng/mL     The normal value for the drug tested is negative. This report includes final unconfirmed screening results to be used for medical treatment purposes only. Unconfirmed results must not be used for non-medical purposes such as employment or legal testing. Clinical consideration should be applied to any drug of abuse test, particularly when unconfirmed results are used.           Urine Drug Screen - Urine, Clean Catch [[id number]]  (Abnormal) Collected: 04/18/25 0002    Specimen: Urine, Clean Catch Updated: 04/18/25 0018     THC, Screen, Urine Negative     Phencyclidine (PCP), Urine Negative     Cocaine Screen, Urine Positive     Methamphetamine, Ur Negative     Opiate Screen Negative     Amphetamine Screen, Urine Negative     Benzodiazepine Screen, Urine Negative     Tricyclic Antidepressants Screen Negative     Methadone Screen, Urine Negative     Barbiturates Screen, Urine Negative     Oxycodone Screen, Urine Negative     Buprenorphine, Screen, Urine Negative    Narrative:      Cutoff For Drugs Screened:    Amphetamines               500 ng/ml  Barbiturates               200 ng/ml  Benzodiazepines            150 ng/ml  Cocaine                    150 ng/ml  Methadone                  200 ng/ml  Opiates                     100 ng/ml  Phencyclidine               25 ng/ml  THC                         50 ng/ml  Methamphetamine            500 ng/ml  Tricyclic Antidepressants  300 ng/ml  Oxycodone                  100 ng/ml  Buprenorphine               10 ng/ml    The normal value for all drugs tested is negative. This report includes unconfirmed screening results, with the cutoff values listed, to be used for medical treatment purposes only.  Unconfirmed results must not be used for non-medical purposes such as employment or legal testing.  Clinical consideration should be applied to any drug of abuse test, particularly when unconfirmed results are used.      Magnesium [[id number]]  (Normal) Collected: 04/17/25 2049    Specimen: Blood Updated: 04/17/25 2126     Magnesium 1.7 mg/dL     High Sensitivity Troponin T 1Hr [[id number]]  (Abnormal) Collected: 04/17/25 2049    Specimen: Blood Updated: 04/17/25 2116     HS Troponin T 20 ng/L      Troponin T Numeric Delta 1 ng/L      Troponin T % Delta 5    Narrative:      High Sensitive Troponin T Reference Range:  <14.0 ng/L- Negative Female for AMI  <22.0 ng/L- Negative Male for AMI  >=14 - Abnormal Female indicating possible myocardial injury.  >=22 - Abnormal Male indicating possible myocardial injury.   Clinicians would have to utilize clinical acumen, EKG, Troponin, and serial changes to determine if it is an Acute Myocardial Infarction or myocardial injury due to an underlying chronic condition.         High Sensitivity Troponin T [[id number]]  (Abnormal) Collected: 04/17/25 1933    Specimen: Blood Updated: 04/17/25 2002     HS Troponin T 19 ng/L     Narrative:      High Sensitive Troponin T Reference Range:  <14.0 ng/L- Negative Female for AMI  <22.0 ng/L- Negative Male for AMI  >=14 - Abnormal Female indicating possible myocardial injury.  >=22 - Abnormal Male indicating possible myocardial injury.   Clinicians would have to utilize clinical acumen, EKG, Troponin, and serial changes to  determine if it is an Acute Myocardial Infarction or myocardial injury due to an underlying chronic condition.         CBC & Differential [[id number]]  (Abnormal) Collected: 04/17/25 1933    Specimen: Blood Updated: 04/17/25 2000    Narrative:      The following orders were created for panel order CBC & Differential.  Procedure                               Abnormality         Status                     ---------                               -----------         ------                     CBC Auto Differential[[id number]]        Abnormal            Final result               Scan Slide[[id number]]                                       Final result                 Please view results for these tests on the individual orders.    Scan Slide [[id number]] Collected: 04/17/25 1933    Specimen: Blood Updated: 04/17/25 2000     RBC Morphology Normal     WBC Morphology Normal     Platelet Estimate Adequate    CBC Auto Differential [[id number]]  (Abnormal) Collected: 04/17/25 1933    Specimen: Blood Updated: 04/17/25 1959     WBC 14.30 10*3/mm3      RBC 5.22 10*6/mm3      Hemoglobin 15.2 g/dL      Hematocrit 43.7 %      MCV 83.7 fL      MCH 29.1 pg      MCHC 34.8 g/dL      RDW 13.6 %      RDW-SD 41.5 fl      MPV 10.2 fL      Platelets 235 10*3/mm3      Neutrophil % 45.1 %      Lymphocyte % 43.3 %      Monocyte % 9.3 %      Eosinophil % 1.6 %      Basophil % 0.3 %      Immature Grans % 0.4 %      Neutrophils, Absolute 6.45 10*3/mm3      Lymphocytes, Absolute 6.19 10*3/mm3      Monocytes, Absolute 1.33 10*3/mm3      Eosinophils, Absolute 0.23 10*3/mm3      Basophils, Absolute 0.04 10*3/mm3      Immature Grans, Absolute 0.06 10*3/mm3      nRBC 0.0 /100 WBC     Comprehensive Metabolic Panel [[id number]]  (Abnormal) Collected: 04/17/25 1933    Specimen: Blood Updated: 04/17/25 1959     Glucose 110 mg/dL      BUN 12 mg/dL      Creatinine 0.82 mg/dL      Sodium 135 mmol/L      Potassium 2.9 mmol/L      Chloride 101 mmol/L      CO2 23.4  mmol/L      Calcium 8.9 mg/dL      Total Protein 6.5 g/dL      Albumin 3.9 g/dL      ALT (SGPT) 15 U/L      AST (SGOT) 14 U/L      Alkaline Phosphatase 73 U/L      Total Bilirubin 0.4 mg/dL      Globulin 2.6 gm/dL      A/G Ratio 1.5 g/dL      BUN/Creatinine Ratio 14.6     Anion Gap 10.6 mmol/L      eGFR 87.8 mL/min/1.73     Narrative:      GFR Categories in Chronic Kidney Disease (CKD)      GFR Category          GFR (mL/min/1.73)    Interpretation  G1                     90 or greater         Normal or high (1)  G2                      60-89                Mild decrease (1)  G3a                   45-59                Mild to moderate decrease  G3b                   30-44                Moderate to severe decrease  G4                    15-29                Severe decrease  G5                    14 or less           Kidney failure          (1)In the absence of evidence of kidney disease, neither GFR category G1 or G2 fulfill the criteria for CKD.    eGFR calculation 2021 CKD-EPI creatinine equation, which does not include race as a factor    [place] Draw [[id number]] Collected: 04/17/25 1933    Specimen: Blood Updated: 04/17/25 1946    Narrative:      The following orders were created for panel order [place] Draw.  Procedure                               Abnormality         Status                     ---------                               -----------         ------                     Green Top (Gel)[[id number]]                                  Final result               Lavender Top[[id number]]                                     Final result               Gold Top - SST[[id number]]                                   Final result               Light Blue Top[[id number]]                                   Final result                 Please view results for these tests on the individual orders.    Green Top (Gel) [[id number]] Collected: 04/17/25 1933    Specimen: Blood Updated: 04/17/25 1946     Extra Tube Hold for add-ons.      Comment: Auto resulted.       Lavender Top [[id number]] Collected: 04/17/25 1933    Specimen: Blood Updated: 04/17/25 1946     Extra Tube hold for add-on     Comment: Auto resulted       Gold Top - SST [[id number]] Collected: 04/17/25 1933    Specimen: Blood Updated: 04/17/25 1946     Extra Tube Hold for add-ons.     Comment: Auto resulted.       Light Blue Top [[id number]] Collected: 04/17/25 1933    Specimen: Blood Updated: 04/17/25 1946     Extra Tube Hold for add-ons.     Comment: Auto resulted             Imaging Results (Last 24 Hours)       Procedure Component Value Units Date/Time    CT Angiogram Chest [[id number]] Collected: 04/17/25 2247     Updated: 04/17/25 2249    Narrative:      FINAL REPORT    TECHNIQUE:  null    CLINICAL HISTORY:  chest pain, SOA    COMPARISON:  null    FINDINGS:  CT angiography chest with contrast. 3D Postprocessing.    Comparison: None    Findings:    The pulmonary arteries are well opacified.    Mild plaque in the thoracic aorta without aneurysm or dissection.    The heart is not enlarged. There is no pericardial effusion.    No enlarged mediastinal or hilar lymph nodes.    The thyroid is unremarkable.    Minimal paraseptal emphysematous changes in the lung apices.    No focal airspace consolidation, pleural effusion, or pneumothorax.    No worrisome pulmonary masses or nodules.    Series cirrhotic liver morphology. Prior cholecystectomy.    No acute fractures.      Impression:      IMPRESSION:    1. No pulmonary emboli. No acute parenchymal lung disease.    2. Minimal paraseptal emphysematous changes in the lung apices.    3. Cirrhotic liver morphology.    Authenticated and Electronically Signed by Paul Beltre MD on  04/17/2025 10:47:57 PM    XR Chest 1 View [[id number]] Resulted: 04/17/25 1933     Updated: 04/17/25 1951          Orders (last 24 hrs)        Start     Ordered    04/18/25 0200  nitroglycerin (NITROSTAT) SL tablet 0.4 mg  Every 5 Minutes PRN,   Status:  Discontinued          04/18/25 0200    04/18/25 0153  ketorolac (TORADOL) injection 15 mg  Every 6 Hours PRN,   Status:  Discontinued         04/18/25 0153    04/18/25 0113  nicotine (NICODERM CQ) 21 MG/24HR patch 1 patch  Every 24 Hours Scheduled,   Status:  Discontinued         04/18/25 0106    04/18/25 0006  Fentanyl, Urine - Urine, Clean Catch  Once         04/18/25 0005    04/17/25 2357  famotidine (PEPCID) injection 20 mg  Once         04/17/25 2341    04/17/25 2356  nitroglycerin (NITROSTAT) ointment 1 inch  Once         04/17/25 2340    04/17/25 2356  morphine injection 4 mg  Once         04/17/25 2340    04/17/25 2350  Initiate Observation Status  Once         04/17/25 2349 04/17/25 2347  Urine Drug Screen - Urine, Clean Catch  Once         04/17/25 2346    04/17/25 2220  iopamidol (ISOVUE-300) 61 % injection 100 mL  Once in Imaging         04/17/25 2204 04/17/25 2140  ondansetron (ZOFRAN) injection 4 mg  Once         04/17/25 2124 04/17/25 2139  sodium chloride 0.9 % bolus 500 mL  Once         04/17/25 2124 04/17/25 2136  potassium chloride (MICRO-K/KLOR-CON) CR capsule  Once         04/17/25 2120 04/17/25 2136  potassium chloride 10 mEq in 100 mL IVPB  Once         04/17/25 2120 04/17/25 2122  CT Angiogram Chest  1 Time Imaging         04/17/25 2122 04/17/25 2121  Magnesium  Once         04/17/25 2120 04/17/25 2033  High Sensitivity Troponin T 1Hr  PROCEDURE ONCE         04/17/25 2002 04/17/25 1946  aspirin tablet 325 mg  Once         04/17/25 1930 04/17/25 1940  Scan Slide  Once         04/17/25 1939 04/17/25 1930  NPO Diet NPO Type: Strict NPO  Diet Effective Now,   Status:  Canceled         04/17/25 1930 04/17/25 1930  Undress & Gown  Once         04/17/25 1930 04/17/25 1930  Cardiac Monitoring  Continuous,   Status:  Canceled        Comments: Follow Standing Orders As Outlined in Process Instructions (Open Order Report to View Full Instructions)    04/17/25 1930 04/17/25  1930  Continuous Pulse Oximetry  Continuous         04/17/25 1930 04/17/25 1930  ECG 12 Lead ED Triage Standing Order; Chest Pain  Once         04/17/25 1930 04/17/25 1930  XR Chest 1 View  1 Time Imaging         04/17/25 1930 04/17/25 1930  Insert Peripheral IV  Once,   Status:  Canceled         04/17/25 1930 04/17/25 1930  [place] Draw  Once         04/17/25 1930 04/17/25 1930  CBC & Differential  Once         04/17/25 1930 04/17/25 1930  Comprehensive Metabolic Panel  Once         04/17/25 1930 04/17/25 1930  High Sensitivity Troponin T  Once         04/17/25 1930 04/17/25 1930  Green Top (Gel)  PROCEDURE ONCE         04/17/25 1930 04/17/25 1930  Lavender Top  PROCEDURE ONCE         04/17/25 1930 04/17/25 1930  Gold Top - SST  PROCEDURE ONCE         04/17/25 1930 04/17/25 1930  Light Blue Top  PROCEDURE ONCE         04/17/25 1930 04/17/25 1930  CBC Auto Differential  PROCEDURE ONCE         04/17/25 1930 04/17/25 1929  Oxygen Therapy- Nasal Cannula; Titrate 1-6 LPM Per SpO2; 90 - 95%  Continuous PRN,   Status:  Canceled       04/17/25 1930 04/17/25 1929  ECG 12 Lead ED Triage Standing Order; Chest Pain  As Needed,   Status:  Canceled      Comments: Persistent, Unrelieved or Recurrent Chest Pain    04/17/25 1930 04/17/25 1929  sodium chloride 0.9 % flush 10 mL  As Needed,   Status:  Discontinued         04/17/25 1930                  Physician Progress Notes (most recent note)    No notes of this type exist for this encounter.       Consult Notes (most recent note)    No notes of this type exist for this encounter.          Discharge Summary        Vasile Boles DO at 04/18/25 0218          Patient left AMA.  She was counseled on risk of leaving the hospital AGAINST MEDICAL ADVICE to include worsening chest pain, cardiac condition as well as death.  She was advised to discontinue illicit drug use.    Electronically signed by Vasile Boles DO at 04/18/25 0219

## 2025-04-19 NOTE — PAYOR COMM NOTE
"  D/c SUMMARY  UR MANAGER; OMAR GR RN  [phone] AND [phone]     NPI:  [id number]  TAX ID:  [id number]          Vashti Arredondo (49 y.o. Female)       Date of Birth   1975    Social Security Number       Address   [address] [place], [place] [address]    Home Phone   [phone]    MRN   [id number]       Sabianism   Restorationism    Marital Status                               Admission Date   4/17/2025    Admission Type   Emergency    Admitting Provider       Attending Provider       Department, Room/Bed   [facility] EMERGENCY DEPARTMENT, 01SF/01       Discharge Date   4/18/2025    Discharge Disposition   Left Against Medical Advice    Discharge Destination   Home                              Attending Provider: (none)   Allergies: Lyrica [Pregabalin], Reglan [Metoclopramide]    Isolation: None   Infection: None   Code Status: Prior    Ht: 165.1 cm (65\")   Wt: 109 kg (240 lb)    Admission Cmt: None   Principal Problem: Chest pain [R07.9]                   Active Insurance as of 4/17/2025       Primary Coverage       Payor Plan Insurance Group Employer/Plan Group    [employer] [place] [employer]        Payor Plan Address Payor Plan Phone Number Payor Plan Fax Number Effective Dates    [address] [address] [phone]  6/23/2016 - None Entered    [facility] [id number]         Subscriber Name Subscriber Birth Date Member ID       VASHTI ARREDONDO 1975 [id number]                     Emergency Contacts        (Rel.) Home Phone Work Phone Mobile Phone    Lucas Arredondo (Daughter) -- -- [phone]              Physician Progress Notes (last 24 hours)  Notes from 04/18/25 0751 through 04/19/25 0751   No notes of this type exist for this encounter.          Discharge Summary        Vasile Boles DO at 04/18/25 0218          Patient left AMA.  She was counseled on risk of leaving the hospital AGAINST MEDICAL ADVICE to include worsening chest " pain, cardiac condition as well as death.  She was advised to discontinue illicit drug use.    Electronically signed by Vasile Boles DO at 04/18/25 6161

## (undated) DEVICE — NDL SPINE 22G 31/2IN BLK

## (undated) DEVICE — TRY L/P SFTY A/20G